# Patient Record
Sex: MALE | Race: WHITE | Employment: FULL TIME | URBAN - METROPOLITAN AREA
[De-identification: names, ages, dates, MRNs, and addresses within clinical notes are randomized per-mention and may not be internally consistent; named-entity substitution may affect disease eponyms.]

---

## 2017-08-31 ENCOUNTER — HOSPITAL ENCOUNTER (OUTPATIENT)
Dept: PHYSICAL THERAPY | Age: 47
Discharge: HOME | End: 2017-08-31
Payer: COMMERCIAL

## 2017-08-31 PROCEDURE — 97140 MANUAL THERAPY 1/> REGIONS: CPT

## 2017-08-31 PROCEDURE — 97162 PT EVAL MOD COMPLEX 30 MIN: CPT

## 2017-08-31 NOTE — PROGRESS NOTES
Moon Richards  : 1970 [facility]  [address], [address], [place]  [phone]   [phone]         OUTPATIENT PHYSICAL THERAPY:Initial Assessment and Daily Note 2017    ICD-10: Treatment Diagnosis: Pain in left shoulder (M25.512); Bursitis of left shoulder (M75.52)  Precautions/Allergies: Other plant, animal, environmental  Fall Risk Score: 1 (? 5 = High Risk)  MD Orders: PT eval and tx - shoulder program MEDICAL/REFERRING DIAGNOSIS:  Pain in left shoulder   DATE OF ONSET: Spring of 2017  REFERRING PHYSICIAN: Saul Champagne MD  RETURN PHYSICIAN APPOINTMENT: 17     INITIAL ASSESSMENT:  Mr. Olivia Smith presents to PT eval w/ report of L shoulder pain. He displayed L shoulder weakness and decreased stability w/ testing. He reports difficulty performing his job demands and heavy house hold chores. He will benefit from continued skilled PT services to improve his deficits listed below and to progress towards his PLOF. PROBLEM LIST (Impacting functional limitations):  1. Decreased Strength  2. Decreased ADL/Functional Activities  3. Increased Pain  4. Decreased Activity Tolerance  5. Edema/Girth  6. Decreased [place] with Home Exercise Program INTERVENTIONS PLANNED:  1. Cold  2. Electrical Stimulation  3. Heat  4. Home Exercise Program (HEP)  5. Manual Therapy  6. Neuromuscular Re-education/Strengthening  7. Range of Motion (ROM)  8. Therapeutic Activites  9. Therapeutic Exercise/Strengthening   TREATMENT PLAN:  Effective Dates: 17 TO 18. Frequency/Duration: 2 times a week for 4 weeks  GOALS: (Goals have been discussed and agreed upon with patient. Discharge Goals: Time Frame: 4 weeks  1. Pt will be independent w/ HEP in order to improve outcomes and decrease pain levels. 2. Pt will havwe QUICKDASH score of 19 or less in order to display decreased pain and decreased functional impairments.    3. Pt will complete 10 continuous minutes on the UBE level 2 in order to display improved strength and endurance for return to work. 4. Pt will report L shoulder pain of 1/10 while abduction his L shoulder in order to return to work duties and household chores. Rehabilitation Potential For Stated Goals: Good  Regarding Rosalynd Pulse therapy, I certify that the treatment plan above will be carried out by a therapist or under their direction. Thank you for this referral,  Nicholas Eldridge PT     Referring Physician Signature: Jay Marie MD              Date                    The information in this section was collected on 8/31/17 (except where otherwise noted). HISTORY:   History of Present Injury/Illness (Reason for Referral):  Pt reports he began to have L shoulder pain at work in the Spring. He was diagnosed w/ L shoulder bursitis by his MD. He had a cortisone shot on 8/28/17. He is on a 10# work restriction per his MD.   Past Medical History/Comorbidities:   Mr. Jeffry Watts  has a past medical history of Anxiety; Hypertriglyceridemia; Low HDL (under 40); Palpitations; and Pituitary tumor. Mr. Jeffry Watts  has no past surgical history on file. Social History/Living Environment:     Lives w/ spouse in private home w/ stairs. He has a tub/shower combo. Prior Level of Function/Work/Activity:  Works at [employer] where he has to be able to lift 25# repeatedly to perform his job duties. 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 He walks 3x a week for 30 minutes. Dominant Side:         RIGHT    Current Medications:       Current Outpatient Prescriptions:     levothyroxine (SYNTHROID) 88 mcg tablet, Take 88 mcg by mouth., Disp: , Rfl:     CABERGOLINE (DOSTINEX PO), Take 0.5 mg by mouth., Disp: , Rfl:    Date Last Reviewed:  8/31/2017    Number of Personal Factors/Comorbidities that affect the Plan of Care: 1-2: MODERATE COMPLEXITY   EXAMINATION:   Observation/Orthostatic Postural Assessment:           Forward head, rounded shoulders, kyphotic posture  Palpation:          Tender to L biceps and deltoid   Special Tests:          [place]: (+)        Painful Arc: (+)        Sulcus sign (+)        Infraspinatus test: painful        Lift off: painful         Hands behind head and behind back - equal on both sides  Edema/Girth:  Mild swelling noted on L shoulder   Sensation:         WFL on B UEs w/ light touch    Joint/Muscle ROM Strength    Shoulder flexion WFL L 4-/5, R 5/5    Shoulder extension WFL L 5/5, R 5/5    Shoulder IR WFL L 4-/5, R 5/5    Shoulder ER WFL L 4-/5, R 5/5    Elbow flexion [facility] WFL    Elbow extension [facility]         Body Structures Involved:  1. Joints  2. Muscles Body Functions Affected:  1. Sensory/Pain  2. Neuromusculoskeletal  3. Movement Related Activities and Participation Affected:  1. General Tasks and Demands  2. Mobility  3. Self Care  4. Domestic Life  5. Interpersonal Interactions and Relationships  6. Community, Social and [place]   Number of elements (examined above) that affect the Plan of Care: 3: MODERATE COMPLEXITY   CLINICAL PRESENTATION:   Presentation: Evolving clinical presentation with changing clinical characteristics: MODERATE COMPLEXITY   CLINICAL DECISION MAKING:   Outcome Measure: Tool Used: Disabilities of the Arm, Shoulder and Hand (DASH) Questionnaire - Quick Version  Score:  Initial: 20/55  Most Recent: X/55 (Date: -- )   Interpretation of Score: The DASH is designed to measure the activities of daily living in person's with upper extremity dysfunction or pain. Each section is scored on a 1-5 scale, 5 representing the greatest disability. The scores of each section are added together for a total score of 55. Score 11 12-19 20-28 29-37 38-45 46-54 55   Modifier CH CI CJ CK CL CM CN     ?  Carrying, Moving, and Handling Objects:     - CURRENT STATUS: CJ - 20%-39% impaired, limited or restricted    - GOAL STATUS: CI - 1%-19% impaired, limited or restricted    - D/C STATUS:  ---------------To be determined---------------        · Medical Necessity: Patient is expected to demonstrate progress in strength, range of motion and balance to increase independence with functional tasks. Reason for Services/Other Comments:  · Patient continues to demonstrate capacity to improve strength, motion, pain levels which will increase independence. Use of outcome tool(s) and clinical judgement create a POC that gives a: Questionable prediction of patient's progress: MODERATE COMPLEXITY            TREATMENT:   (In addition to Assessment/Re-Assessment sessions the following treatments were rendered)  Pre-treatment Symptoms/Complaints:  See above. Pain: Initial:     4/10 Post Session:  0/10     THERAPEUTIC EXERCISE: (5 minutes):  Exercises per grid below to improve mobility and strength. Required minimal verbal and manual cues to promote proper body alignment, promote proper body posture and promote proper body mechanics. Progressed resistance, range, repetitions and complexity of movement as indicated. MANUAL THERAPY: (7 minutes): Joint mobilization and Soft tissue mobilization was utilized and necessary because of the patient's painful spasm and restricted motion of soft tissue. MODALITIES: (10 minutes):      *  Cold Pack Therapy in order to provide analgesia and reduce inflammation and edema. Posterior, inferior, and long axis glides to L shoulder  STM to L bicep/deltoid   Date:  8/31/17 Date:   Date:     Activity/Exercise Parameters Parameters Parameters   HEP  Issued - shoulder ER, IR, flexion, and scap retraction     Shoulder flexion 10x RTB     Shoulder IR 10x RTB     Shoulder ER 10x RTB     Scapular retraction 10x                      Treatment/Session Assessment:    · Response to Treatment:  Pt reported decreased pain w/ movement following manual treatment. He noted no pain following ice/compression to L shoulder.   · Compliance with Program/Exercises: Will assess as treatment progresses. · Recommendations/Intent for next treatment session: \"Next visit will focus on advancements to more challenging activities\".   Variance from POC: none  PT Patient Time In/Time Out  Time In: 1330  Time Out: 1421    Sean Beat, PT

## 2017-08-31 NOTE — PROGRESS NOTES
Ambulatory/Rehab Services H2 Model Falls Risk Assessment    Risk Factor Pts. ·   Confusion/Disorientation/Impulsivity  []    4 ·   Symptomatic Depression  []   2 ·   Altered Elimination  []   1 ·   Dizziness/Vertigo  []   1 ·   Gender (Male)  [x]   1 ·   Any administered antiepileptics (anticonvulsants):  []   2 ·   Any administered benzodiazepines:  []   1 ·   Visual Impairment (specify):  []   1 ·   Portable Oxygen Use  []   1 ·   Orthostatic ? BP  []   1 ·   History of Recent Falls (within 3 mos.)  []   5     Ability to Rise from Chair (choose one) Pts. ·   Ability to rise in a single movement  [x]   0 ·   Pushes up, successful in one attempt  []   1 ·   Multiple attempts, but successful  []   3 ·   Unable to rise without assistance  []   4   Total: (5 or greater = High Risk) 1     Falls Prevention Plan:   []                Physical Limitations to Exercise (specify):   []                Mobility Assistance Device (type):   []                Exercise/Equipment Adaptation (specify):    ©2010 [facility] [place] Patent #[id number].  Federal Law prohibits the replication, distribution or use without written permission from [facility]

## 2017-09-05 ENCOUNTER — HOSPITAL ENCOUNTER (OUTPATIENT)
Dept: PHYSICAL THERAPY | Age: 47
Discharge: HOME | End: 2017-09-05
Payer: COMMERCIAL

## 2017-09-05 PROCEDURE — 97110 THERAPEUTIC EXERCISES: CPT

## 2017-09-05 PROCEDURE — 97140 MANUAL THERAPY 1/> REGIONS: CPT

## 2017-09-05 NOTE — PROGRESS NOTES
Jackie Puckett  : 1970 [facility] Center at [facility]  [address], [address], [place]  [phone]   [phone]         OUTPATIENT PHYSICAL THERAPY:Daily Note 2017    ICD-10: Treatment Diagnosis: Pain in left shoulder (M25.512); Bursitis of left shoulder (M75.52)  Precautions/Allergies: Other plant, animal, environmental  Fall Risk Score: 1 (? 5 = High Risk)  MD Orders: PT eval and tx - shoulder program MEDICAL/REFERRING DIAGNOSIS:  Pain in left shoulder   DATE OF ONSET: Spring of 2017  REFERRING PHYSICIAN: Cade Mcclain MD  RETURN PHYSICIAN APPOINTMENT: 17      ASSESSMENT:  Mr. Ty Matthews presents to PT eval w/ report of L shoulder pain. He displayed L shoulder weakness and decreased stability w/ testing. He reports difficulty performing his job demands and heavy house hold chores. He will benefit from continued skilled PT services to improve his deficits listed below and to progress towards his PLOF. PROBLEM LIST (Impacting functional limitations):  1. Decreased Strength  2. Decreased ADL/Functional Activities  3. Increased Pain  4. Decreased Activity Tolerance  5. Edema/Girth  6. Decreased [place] with Home Exercise Program INTERVENTIONS PLANNED:  1. Cold  2. Electrical Stimulation  3. Heat  4. Home Exercise Program (HEP)  5. Manual Therapy  6. Neuromuscular Re-education/Strengthening  7. Range of Motion (ROM)  8. Therapeutic Activites  9. Therapeutic Exercise/Strengthening   TREATMENT PLAN:  Effective Dates: 17 TO 18. Frequency/Duration: 2 times a week for 4 weeks  GOALS: (Goals have been discussed and agreed upon with patient. Discharge Goals: Time Frame: 4 weeks  1. Pt will be independent w/ HEP in order to improve outcomes and decrease pain levels. 2. Pt will havwe QUICKDASH score of 19 or less in order to display decreased pain and decreased functional impairments.    3. Pt will complete 10 continuous minutes on the UBE level 2 in order to display improved strength and endurance for return to work. 4. Pt will report L shoulder pain of 1/10 while abduction his L shoulder in order to return to work duties and household chores. Rehabilitation Potential For Stated Goals: Good  Regarding Janel Betancourt therapy, I certify that the treatment plan above will be carried out by a therapist or under their direction. The information in this section was collected on 8/31/17 (except where otherwise noted). HISTORY:   History of Present Injury/Illness (Reason for Referral):  Pt reports he began to have L shoulder pain at work in the Spring. He was diagnosed w/ L shoulder bursitis by his MD. He had a cortisone shot on 8/28/17. He is on a 10# work restriction per his MD.   Past Medical History/Comorbidities:   Mr. Maldonado Matos  has a past medical history of Anxiety; Hypertriglyceridemia; Low HDL (under 40); Palpitations; and Pituitary tumor. Mr. Maldonado Matos  has no past surgical history on file. Social History/Living Environment:     Lives w/ spouse in private home w/ stairs. He has a tub/shower combo. Prior Level of Function/Work/Activity:  Works at [employer] where he has to be able to lift 25# repeatedly to perform his job duties. 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 He walks 3x a week for 30 minutes. Dominant Side:         RIGHT    Current Medications:       Current Outpatient Prescriptions:     levothyroxine (SYNTHROID) 88 mcg tablet, Take 88 mcg by mouth., Disp: , Rfl:     CABERGOLINE (DOSTINEX PO), Take 0.5 mg by mouth., Disp: , Rfl:    Date Last Reviewed:  9/5/2017    EXAMINATION:   Observation/Orthostatic Postural Assessment:           Forward head, rounded shoulders, kyphotic posture  Palpation:          Tender to L biceps and deltoid   Special Tests:          [place]: (+)        Painful Arc: (+)        Sulcus sign (+)        Infraspinatus test: painful        Lift off: painful         Hands behind head and behind back - equal on both sides  Edema/Girth:  Mild swelling noted on L shoulder   Sensation:         WFL on B UEs w/ light touch    Joint/Muscle ROM Strength    Shoulder flexion WFL L 4-/5, R 5/5    Shoulder extension WFL L 5/5, R 5/5    Shoulder IR WFL L 4-/5, R 5/5    Shoulder ER WFL L 4-/5, R 5/5    Elbow flexion [facility] WFL    Elbow extension [facility]         Body Structures Involved:  1. Joints  2. Muscles Body Functions Affected:  1. Sensory/Pain  2. Neuromusculoskeletal  3. Movement Related Activities and Participation Affected:  1. General Tasks and Demands  2. Mobility  3. Self Care  4. Domestic Life  5. Interpersonal Interactions and Relationships  6. Community, Social and Civic Life   CLINICAL PRESENTATION:   CLINICAL DECISION MAKING:   Outcome Measure: Tool Used: Disabilities of the Arm, Shoulder and Hand (DASH) Questionnaire - Quick Version  Score:  Initial: 20/55  Most Recent: X/55 (Date: -- )   Interpretation of Score: The DASH is designed to measure the activities of daily living in person's with upper extremity dysfunction or pain. Each section is scored on a 1-5 scale, 5 representing the greatest disability. The scores of each section are added together for a total score of 55. Score 11 12-19 20-28 29-37 38-45 46-54 55   Modifier CH CI CJ CK CL CM CN     ? Carrying, Moving, and Handling Objects:     - CURRENT STATUS: CJ - 20%-39% impaired, limited or restricted    - GOAL STATUS: CI - 1%-19% impaired, limited or restricted    - D/C STATUS:  ---------------To be determined---------------        · Medical Necessity: Patient is expected to demonstrate progress in strength, range of motion and balance to increase independence with functional tasks. Reason for Services/Other Comments:  · Patient continues to demonstrate capacity to improve strength, motion, pain levels which will increase independence.             TREATMENT:   (In addition to Assessment/Re-Assessment sessions the following treatments were rendered)  Pre-treatment Symptoms/Complaints:  Pt reports he is feeling pretty good. He notes he still has discomfort w/ abduction. Pain: Initial:     2/10 Post Session:  0/10     THERAPEUTIC EXERCISE: (25 minutes):  Exercises per grid below to improve mobility, strength, balance and coordination. Required minimal verbal and manual cues to promote proper body alignment, promote proper body posture and promote proper body mechanics. Progressed resistance, range, repetitions and complexity of movement as indicated. MANUAL THERAPY: (20 minutes): Joint mobilization and Soft tissue mobilization was utilized and necessary because of the patient's restricted joint motion, painful spasm and restricted motion of soft tissue. MODALITIES: (10 minutes):      *  Cold Pack Therapy in order to provide analgesia and reduce inflammation and edema. Posterior, inferior, and long axis glides to L shoulder -grades 2-3  STM to L bicep/deltoid   Date:  8/31/17 Date:  9/5/17 Date:     Activity/Exercise Parameters Parameters Parameters   HEP  Issued - shoulder ER, IR, flexion, and scap retraction     Shoulder flexion 10x RTB     Shoulder IR 10x RTB 3x10, GTB    Shoulder ER 10x RTB 3x10, GTB    Scapular retraction 10x     Wall clock      UBE  1.6 5/5    Quadruped balance      Body blade      Pulleys  20x abd and flex    Rows  2x10, stopped due to pain, GTB    Is  YTB, 15x         Treatment/Session Assessment:    · Response to Treatment:  Pt required verbal/tactile cues for completion of UE therex. He is progressing towards his goals, but still having pain in 90* abduction. He had tightness/tenderness on his L deltoid/biceps. · Compliance with Program/Exercises: Will assess as treatment progresses. · Recommendations/Intent for next treatment session: \"Next visit will focus on advancements to more challenging activities\".   Variance from POC: none  PT Patient Time In/Time Out  Time In: 4858  Time Out: 36629 Lyndon Lawrence

## 2017-09-07 ENCOUNTER — HOSPITAL ENCOUNTER (OUTPATIENT)
Dept: PHYSICAL THERAPY | Age: 47
Discharge: HOME | End: 2017-09-07
Payer: COMMERCIAL

## 2017-09-07 PROCEDURE — 97110 THERAPEUTIC EXERCISES: CPT

## 2017-09-07 NOTE — PROGRESS NOTES
Ondina Whittaker  : 1970 [facility]  [address], [address], [place]  [phone]   [phone]         OUTPATIENT PHYSICAL THERAPY:Daily Note 2017    ICD-10: Treatment Diagnosis: Pain in left shoulder (M25.512); Bursitis of left shoulder (M75.52)  Precautions/Allergies: Other plant, animal, environmental  Fall Risk Score: 1 (? 5 = High Risk)  MD Orders: PT eval and tx - shoulder program MEDICAL/REFERRING DIAGNOSIS:  Pain in left shoulder   DATE OF ONSET: Spring of 2017  REFERRING PHYSICIAN: Chandan Rosales MD  RETURN PHYSICIAN APPOINTMENT: 17      ASSESSMENT:  Mr. Jason Paiz presents to PT mario w/ report of L shoulder pain. He displayed L shoulder weakness and decreased stability w/ testing. He reports difficulty performing his job demands and heavy house hold chores. He will benefit from continued skilled PT services to improve his deficits listed below and to progress towards his PLOF. PROBLEM LIST (Impacting functional limitations):  1. Decreased Strength  2. Decreased ADL/Functional Activities  3. Increased Pain  4. Decreased Activity Tolerance  5. Edema/Girth  6. Decreased [place] with Home Exercise Program INTERVENTIONS PLANNED:  1. Cold  2. Electrical Stimulation  3. Heat  4. Home Exercise Program (HEP)  5. Manual Therapy  6. Neuromuscular Re-education/Strengthening  7. Range of Motion (ROM)  8. Therapeutic Activites  9. Therapeutic Exercise/Strengthening   TREATMENT PLAN:  Effective Dates: 17 TO 18. Frequency/Duration: 2 times a week for 4 weeks  GOALS: (Goals have been discussed and agreed upon with patient. Discharge Goals: Time Frame: 4 weeks  1. Pt will be independent w/ HEP in order to improve outcomes and decrease pain levels. 2. Pt will havwe QUICKDASH score of 19 or less in order to display decreased pain and decreased functional impairments.    3. Pt will complete 10 continuous minutes on the UBE level 2 in order to display improved strength and endurance for return to work. 4. Pt will report L shoulder pain of 1/10 while abduction his L shoulder in order to return to work duties and household chores. Rehabilitation Potential For Stated Goals: Good  Regarding Shankar Farfan therapy, I certify that the treatment plan above will be carried out by a therapist or under their direction. The information in this section was collected on 8/31/17 (except where otherwise noted). HISTORY:   History of Present Injury/Illness (Reason for Referral):  Pt reports he began to have L shoulder pain at work in the Spring. He was diagnosed w/ L shoulder bursitis by his MD. He had a cortisone shot on 8/28/17. He is on a 10# work restriction per his MD.   Past Medical History/Comorbidities:   Mr. Maryann Dennis  has a past medical history of Anxiety; Hypertriglyceridemia; Low HDL (under 40); Palpitations; and Pituitary tumor. Mr. Maryann Dennis  has no past surgical history on file. Social History/Living Environment:     Lives w/ spouse in private home w/ stairs. He has a tub/shower combo. Prior Level of Function/Work/Activity:  Works at [employer] where he has to be able to lift 25# repeatedly to perform his job duties. 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 He walks 3x a week for 30 minutes. Dominant Side:         RIGHT    Current Medications:       Current Outpatient Prescriptions:     levothyroxine (SYNTHROID) 88 mcg tablet, Take 88 mcg by mouth., Disp: , Rfl:     CABERGOLINE (DOSTINEX PO), Take 0.5 mg by mouth., Disp: , Rfl:    Date Last Reviewed:  9/7/2017    EXAMINATION:   Observation/Orthostatic Postural Assessment:           Forward head, rounded shoulders, kyphotic posture  Palpation:          Tender to L biceps and deltoid   Special Tests:          Zahida Bravo: (+)        Painful Arc: (+)        Sulcus sign (+)        Infraspinatus test: painful        Lift off: painful         Hands behind head and behind back - equal on both sides  Edema/Girth:  Mild swelling noted on L shoulder   Sensation:         WFL on B UEs w/ light touch    Joint/Muscle ROM Strength    Shoulder flexion WFL L 4-/5, R 5/5    Shoulder extension WFL L 5/5, R 5/5    Shoulder IR WFL L 4-/5, R 5/5    Shoulder ER WFL L 4-/5, R 5/5    Elbow flexion [facility] WFL    Elbow extension [facility]         Body Structures Involved:  1. Joints  2. Muscles Body Functions Affected:  1. Sensory/Pain  2. Neuromusculoskeletal  3. Movement Related Activities and Participation Affected:  1. General Tasks and Demands  2. Mobility  3. Self Care  4. Domestic Life  5. Interpersonal Interactions and Relationships  6. Community, Social and Civic Life   CLINICAL PRESENTATION:   CLINICAL DECISION MAKING:   Outcome Measure: Tool Used: Disabilities of the Arm, Shoulder and Hand (DASH) Questionnaire - Quick Version  Score:  Initial: 20/55  Most Recent: X/55 (Date: -- )   Interpretation of Score: The DASH is designed to measure the activities of daily living in person's with upper extremity dysfunction or pain. Each section is scored on a 1-5 scale, 5 representing the greatest disability. The scores of each section are added together for a total score of 55. Score 11 12-19 20-28 29-37 38-45 46-54 55   Modifier CH CI CJ CK CL CM CN     ? Carrying, Moving, and Handling Objects:     - CURRENT STATUS: CJ - 20%-39% impaired, limited or restricted    - GOAL STATUS: CI - 1%-19% impaired, limited or restricted    - D/C STATUS:  ---------------To be determined---------------        · Medical Necessity: Patient is expected to demonstrate progress in strength, range of motion and balance to increase independence with functional tasks. Reason for Services/Other Comments:  · Patient continues to demonstrate capacity to improve strength, motion, pain levels which will increase independence.             TREATMENT:   (In addition to Assessment/Re-Assessment sessions the following treatments were rendered)  Pre-treatment Symptoms/Complaints:  Pt reports he is feeling pretty good. He notes he is having less pain w/ abduction. Pain: Initial:     1/10 Post Session:  0/10     THERAPEUTIC EXERCISE: (44 minutes):  Exercises per grid below to improve mobility, strength, balance and coordination. Required minimal verbal and manual cues to promote proper body alignment, promote proper body posture and promote proper body mechanics. Progressed resistance, range, repetitions and complexity of movement as indicated. MODALITIES: (10 minutes):      *  Cold Pack Therapy in order to provide analgesia and reduce inflammation and edema. Long axis glides   Date:  8/31/17 Date:  9/5/17 Date:  9/7/17   Activity/Exercise Parameters Parameters Parameters   HEP  Issued - shoulder ER, IR, flexion, and scap retraction     Shoulder flexion 10x RTB     Shoulder IR 10x RTB 3x10, GTB 3x10, GTB   Shoulder ER 10x RTB 3x10, GTB 3x10, GTB   Scapular retraction 10x     Wall clock   10x, YTB   UBE  1.6 5/5 1.5 5/5   Quadruped balance      Body blade   10x into flexion   Pulleys  20x abd and flex 30x abd   Rows  2x10, stopped due to pain, GTB 3x10, prone, 2#   Is  YTB, 15x 3x10, prone, 2#        Treatment/Session Assessment:    · Response to Treatment:  Pt required verbal/tactile cues for completion of UE therex. He is progressing towards his goals. · Compliance with Program/Exercises: Will assess as treatment progresses. · Recommendations/Intent for next treatment session: \"Next visit will focus on advancements to more challenging activities\".   Variance from POC: none  PT Patient Time In/Time Out  Time In: 1430  Time Out: Sam [address], PT

## 2017-09-12 ENCOUNTER — HOSPITAL ENCOUNTER (OUTPATIENT)
Dept: PHYSICAL THERAPY | Age: 47
Discharge: HOME | End: 2017-09-12
Payer: COMMERCIAL

## 2017-09-12 PROCEDURE — 97140 MANUAL THERAPY 1/> REGIONS: CPT

## 2017-09-12 PROCEDURE — 97110 THERAPEUTIC EXERCISES: CPT

## 2017-09-12 NOTE — PROGRESS NOTES
Ondina Whittaker  : 1970 [facility]  [address], [address], [place]  [phone]   [phone]         OUTPATIENT PHYSICAL THERAPY:Daily Note 2017    ICD-10: Treatment Diagnosis: Pain in left shoulder (M25.512); Bursitis of left shoulder (M75.52)  Precautions/Allergies: Other plant, animal, environmental  Fall Risk Score: 1 (? 5 = High Risk)  MD Orders: PT eval and tx - shoulder program MEDICAL/REFERRING DIAGNOSIS:  Pain in left shoulder   DATE OF ONSET: Spring of 2017  REFERRING PHYSICIAN: Chandan Rosales MD  RETURN PHYSICIAN APPOINTMENT: 17      ASSESSMENT:  Mr. Jason Paiz presents to PT mario w/ report of L shoulder pain. He displayed L shoulder weakness and decreased stability w/ testing. He reports difficulty performing his job demands and heavy house hold chores. He will benefit from continued skilled PT services to improve his deficits listed below and to progress towards his PLOF. PROBLEM LIST (Impacting functional limitations):  1. Decreased Strength  2. Decreased ADL/Functional Activities  3. Increased Pain  4. Decreased Activity Tolerance  5. Edema/Girth  6. Decreased [place] with Home Exercise Program INTERVENTIONS PLANNED:  1. Cold  2. Electrical Stimulation  3. Heat  4. Home Exercise Program (HEP)  5. Manual Therapy  6. Neuromuscular Re-education/Strengthening  7. Range of Motion (ROM)  8. Therapeutic Activites  9. Therapeutic Exercise/Strengthening   TREATMENT PLAN:  Effective Dates: 17 TO 18. Frequency/Duration: 2 times a week for 4 weeks  GOALS: (Goals have been discussed and agreed upon with patient. Discharge Goals: Time Frame: 4 weeks  1. Pt will be independent w/ HEP in order to improve outcomes and decrease pain levels. 2. Pt will havwe QUICKDASH score of 19 or less in order to display decreased pain and decreased functional impairments.    3. Pt will complete 10 continuous minutes on the UBE level 2 in order to display improved strength and endurance for return to work. 4. Pt will report L shoulder pain of 1/10 while abduction his L shoulder in order to return to work duties and household chores. Rehabilitation Potential For Stated Goals: Good  Regarding Susen Cassette therapy, I certify that the treatment plan above will be carried out by a therapist or under their direction. The information in this section was collected on 8/31/17 (except where otherwise noted). HISTORY:   History of Present Injury/Illness (Reason for Referral):  Pt reports he began to have L shoulder pain at work in the Spring. He was diagnosed w/ L shoulder bursitis by his MD. He had a cortisone shot on 8/28/17. He is on a 10# work restriction per his MD.   Past Medical History/Comorbidities:   Mr. Daniel Aguilar  has a past medical history of Anxiety; Hypertriglyceridemia; Low HDL (under 40); Palpitations; and Pituitary tumor. Mr. Daniel Aguilar  has no past surgical history on file. Social History/Living Environment:     Lives w/ spouse in private home w/ stairs. He has a tub/shower combo. Prior Level of Function/Work/Activity:  Works at [employer] where he has to be able to lift 25# repeatedly to perform his job duties. 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 He walks 3x a week for 30 minutes. Dominant Side:         RIGHT    Current Medications:       Current Outpatient Prescriptions:     levothyroxine (SYNTHROID) 88 mcg tablet, Take 88 mcg by mouth., Disp: , Rfl:     CABERGOLINE (DOSTINEX PO), Take 0.5 mg by mouth., Disp: , Rfl:    Date Last Reviewed:  9/12/2017    EXAMINATION:   Observation/Orthostatic Postural Assessment:           Forward head, rounded shoulders, kyphotic posture  Palpation:          Tender to L biceps and deltoid   Special Tests:          Ellie Riis: (+)        Painful Arc: (+)        Sulcus sign (+)        Infraspinatus test: painful        Lift off: painful         Hands behind head and behind back - equal on both sides  Edema/Girth:  Mild swelling noted on L shoulder   Sensation:         WFL on B UEs w/ light touch    Joint/Muscle ROM Strength    Shoulder flexion WFL L 4-/5, R 5/5    Shoulder extension WFL L 5/5, R 5/5    Shoulder IR WFL L 4-/5, R 5/5    Shoulder ER WFL L 4-/5, R 5/5    Elbow flexion [facility] WFL    Elbow extension [facility]         Body Structures Involved:  1. Joints  2. Muscles Body Functions Affected:  1. Sensory/Pain  2. Neuromusculoskeletal  3. Movement Related Activities and Participation Affected:  1. General Tasks and Demands  2. Mobility  3. Self Care  4. Domestic Life  5. Interpersonal Interactions and Relationships  6. Community, Social and Civic Life   CLINICAL PRESENTATION:   CLINICAL DECISION MAKING:   Outcome Measure: Tool Used: Disabilities of the Arm, Shoulder and Hand (DASH) Questionnaire - Quick Version  Score:  Initial: 20/55  Most Recent: X/55 (Date: -- )   Interpretation of Score: The DASH is designed to measure the activities of daily living in person's with upper extremity dysfunction or pain. Each section is scored on a 1-5 scale, 5 representing the greatest disability. The scores of each section are added together for a total score of 55. Score 11 12-19 20-28 29-37 38-45 46-54 55   Modifier CH CI CJ CK CL CM CN     ? Carrying, Moving, and Handling Objects:     - CURRENT STATUS: CJ - 20%-39% impaired, limited or restricted    - GOAL STATUS: CI - 1%-19% impaired, limited or restricted    - D/C STATUS:  ---------------To be determined---------------        · Medical Necessity: Patient is expected to demonstrate progress in strength, range of motion and balance to increase independence with functional tasks. Reason for Services/Other Comments:  · Patient continues to demonstrate capacity to improve strength, motion, pain levels which will increase independence.             TREATMENT:   (In addition to Assessment/Re-Assessment sessions the following treatments were rendered)  Pre-treatment Symptoms/Complaints:  Pt reports soreness on L deltoid and tricep area. Pain: Initial:     1/10 Post Session:  0/10     THERAPEUTIC EXERCISE: (30 minutes):  Exercises per grid below to improve mobility, strength, balance and coordination. Required minimal verbal and manual cues to promote proper body alignment, promote proper body posture and promote proper body mechanics. Progressed resistance, range, repetitions and complexity of movement as indicated. MANUAL THERAPY: (15 minutes): Soft tissue mobilization was utilized and necessary because of the patient's painful spasm. MODALITIES: (10 minutes):      *  Cold Pack Therapy in order to provide analgesia and reduce inflammation and edema. Long axis glides   Date:  8/31/17 Date:  9/5/17 Date:  9/7/17 Date:  9/12/17   Activity/Exercise Parameters Parameters Parameters Parameters   HEP  Issued - shoulder ER, IR, flexion, and scap retraction      Shoulder flexion 10x RTB      Shoulder IR 10x RTB 3x10, GTB 3x10, GTB 3x10, GTB   Shoulder ER 10x RTB 3x10, GTB 3x10, GTB 3x10, GTB   Scapular retraction 10x      Wall clock   10x, YTB    UBE  1.6 5/5 1.5 5/5 2.0 5/5   Quadruped balance       Body blade   10x into flexion    Pulleys  20x abd and flex 30x abd 30x into abd    Rows  2x10, stopped due to pain, GTB 3x10, prone, 2# 3x10, prone 2#   Is  YTB, 15x 3x10, prone, 2# 3x10, prone, 2#        Treatment/Session Assessment:    · Response to Treatment:  Pt is progressing towards his goals. Ice continues to decrease his pain levels. · Compliance with Program/Exercises: Will assess as treatment progresses. · Recommendations/Intent for next treatment session: \"Next visit will focus on advancements to more challenging activities\".   Variance from POC: none  PT Patient Time In/Time Out  Time In: 1430  Time Out: [address] [facility], PT

## 2017-09-14 ENCOUNTER — HOSPITAL ENCOUNTER (OUTPATIENT)
Dept: PHYSICAL THERAPY | Age: 47
Discharge: HOME | End: 2017-09-14
Payer: COMMERCIAL

## 2017-09-14 PROCEDURE — 97110 THERAPEUTIC EXERCISES: CPT

## 2017-09-14 PROCEDURE — 97016 VASOPNEUMATIC DEVICE THERAPY: CPT
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
## 2017-09-19 ENCOUNTER — HOSPITAL ENCOUNTER (OUTPATIENT)
Dept: PHYSICAL THERAPY | Age: 47
Discharge: HOME | End: 2017-09-19
Payer: COMMERCIAL

## 2017-09-19 PROCEDURE — 97110 THERAPEUTIC EXERCISES: CPT

## 2017-09-19 NOTE — PROGRESS NOTES
Kalyan Aguilerad  : 1970 [facility] at [facility]  [address], [address], [place]  [phone]   [phone]         OUTPATIENT PHYSICAL THERAPY:Daily Note 2017    ICD-10: Treatment Diagnosis: Pain in left shoulder (M25.512); Bursitis of left shoulder (M75.52)  Precautions/Allergies: Other plant, animal, environmental  Fall Risk Score: 1 (? 5 = High Risk)  MD Orders: PT eval and tx - shoulder program MEDICAL/REFERRING DIAGNOSIS:  Pain in left shoulder   DATE OF ONSET: Spring of 2017  REFERRING PHYSICIAN: Toya Gonzalez MD  RETURN PHYSICIAN APPOINTMENT: 17      ASSESSMENT:  Mr. Percy Webster presents to PT eval w/ report of L shoulder pain. He displayed L shoulder weakness and decreased stability w/ testing. He reports difficulty performing his job demands and heavy house hold chores. He will benefit from continued skilled PT services to improve his deficits listed below and to progress towards his PLOF. PROBLEM LIST (Impacting functional limitations):  1. Decreased Strength  2. Decreased ADL/Functional Activities  3. Increased Pain  4. Decreased Activity Tolerance  5. Edema/Girth  6. Decreased [place] with Home Exercise Program INTERVENTIONS PLANNED:  1. Cold  2. Electrical Stimulation  3. Heat  4. Home Exercise Program (HEP)  5. Manual Therapy  6. Neuromuscular Re-education/Strengthening  7. Range of Motion (ROM)  8. Therapeutic Activites  9. Therapeutic Exercise/Strengthening   TREATMENT PLAN:  Effective Dates: 17 TO 18. Frequency/Duration: 2 times a week for 4 weeks  GOALS: (Goals have been discussed and agreed upon with patient. Discharge Goals: Time Frame: 4 weeks  1. Pt will be independent w/ HEP in order to improve outcomes and decrease pain levels. 2. Pt will havwe QUICKDASH score of 19 or less in order to display decreased pain and decreased functional impairments.    3. Pt will complete 10 continuous minutes on the UBE level 2 in order to display improved strength and endurance for return to work. 4. Pt will report L shoulder pain of 1/10 while abduction his L shoulder in order to return to work duties and household chores. Rehabilitation Potential For Stated Goals: Good  Regarding Juan Tserign therapy, I certify that the treatment plan above will be carried out by a therapist or under their direction. The information in this section was collected on 8/31/17 (except where otherwise noted). HISTORY:   History of Present Injury/Illness (Reason for Referral):  Pt reports he began to have L shoulder pain at work in the Spring. He was diagnosed w/ L shoulder bursitis by his MD. He had a cortisone shot on 8/28/17. He is on a 10# work restriction per his MD.   Past Medical History/Comorbidities:   Mr. Lissy Mccrary  has a past medical history of Anxiety; Hypertriglyceridemia; Low HDL (under 40); Palpitations; and Pituitary tumor. Mr. Lissy Mccrary  has no past surgical history on file. Social History/Living Environment:     Lives w/ spouse in private home w/ stairs. He has a tub/shower combo. Prior Level of Function/Work/Activity:  Works at [employer] where he has to be able to lift 25# repeatedly to perform his job duties. 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 He walks 3x a week for 30 minutes. Dominant Side:         RIGHT    Current Medications:       Current Outpatient Prescriptions:     levothyroxine (SYNTHROID) 88 mcg tablet, Take 88 mcg by mouth., Disp: , Rfl:     CABERGOLINE (DOSTINEX PO), Take 0.5 mg by mouth., Disp: , Rfl:    Date Last Reviewed:  9/19/2017    EXAMINATION:   Observation/Orthostatic Postural Assessment:           Forward head, rounded shoulders, kyphotic posture  Palpation:          Tender to L biceps and deltoid   Special Tests:          Lin Lyn: (+)        Painful Arc: (+)        Sulcus sign (+)        Infraspinatus test: painful        Lift off: painful         Hands behind head and behind back - equal on both sides  Edema/Girth:  Mild swelling noted on L shoulder   Sensation:         WFL on B UEs w/ light touch    Joint/Muscle ROM Strength    Shoulder flexion WFL L 4-/5, R 5/5    Shoulder extension WFL L 5/5, R 5/5    Shoulder IR WFL L 4-/5, R 5/5    Shoulder ER WFL L 4-/5, R 5/5    Elbow flexion [facility] WFL    Elbow extension [facility]         Body Structures Involved:  1. Joints  2. Muscles Body Functions Affected:  1. Sensory/Pain  2. Neuromusculoskeletal  3. Movement Related Activities and Participation Affected:  1. General Tasks and Demands  2. Mobility  3. Self Care  4. Domestic Life  5. Interpersonal Interactions and Relationships  6. Community, Social and Civic Life   CLINICAL PRESENTATION:   CLINICAL DECISION MAKING:   Outcome Measure: Tool Used: Disabilities of the Arm, Shoulder and Hand (DASH) Questionnaire - Quick Version  Score:  Initial: 20/55  Most Recent: X/55 (Date: -- )   Interpretation of Score: The DASH is designed to measure the activities of daily living in person's with upper extremity dysfunction or pain. Each section is scored on a 1-5 scale, 5 representing the greatest disability. The scores of each section are added together for a total score of 55. Score 11 12-19 20-28 29-37 38-45 46-54 55   Modifier CH CI CJ CK CL CM CN     ? Carrying, Moving, and Handling Objects:     - CURRENT STATUS: CJ - 20%-39% impaired, limited or restricted    - GOAL STATUS: CI - 1%-19% impaired, limited or restricted    - D/C STATUS:  ---------------To be determined---------------        · Medical Necessity: Patient is expected to demonstrate progress in strength, range of motion and balance to increase independence with functional tasks. Reason for Services/Other Comments:  · Patient continues to demonstrate capacity to improve strength, motion, pain levels which will increase independence.             TREATMENT:   (In addition to Assessment/Re-Assessment sessions the following treatments were rendered)  Pre-treatment Symptoms/Complaints:  Pt reports soreness after last session. He notes he is definitely getting better. Pain: Initial:     .5/10 Post Session:  0.25/10     MANUAL THERAPY: (0 minutes): Soft tissue mobilization was utilized and necessary because of the patient's painful spasm. Long axis glides  MODALITIES: (10 minutes):      vasopneumatic compression with Game Ready moderate compression and temp 36 degrees to left shoulder for edema reduction. THERAPEUTIC EXERCISE: (44 minutes):  Exercises per grid below to improve mobility, strength, balance and coordination. Required minimal verbal and manual cues to promote proper body alignment, promote proper body posture and promote proper body mechanics. Progressed resistance, range, repetitions and complexity of movement as indicated.    Date:  8/31/17 Date:  9/5/17 Date:  9/7/17 Date:  9/12/17 Date:  9/14/17 Date:  9/19/17   Activity/Exercise Parameters Parameters Parameters Parameters Parameters Parameters   HEP  Issued - shoulder ER, IR, flexion, and scap retraction        Shoulder flexion 10x RTB        Shoulder IR 10x RTB 3x10, GTB 3x10, GTB 3x10, GTB 3x10, GTB    Shoulder ER 10x RTB 3x10, GTB 3x10, GTB 3x10, GTB 3x10, GTB    Scapular retraction 10x        Wall clock   10x, YTB      UBE  1.6 5/5 1.5 5/5 2.0 5/5 2.0 5/5 2.0 5/5   Quadruped balance         Body blade   10x into flexion      Pulleys  20x abd and flex 30x abd 30x into abd      Rows  2x10, stopped due to pain, GTB 3x10, prone, 2# 3x10, prone 2# 3x10, prone 2# 3x10, prone 2#   Is  YTB, 15x 3x10, prone, 2# 3x10, prone, 2# 3x10, prone 2# 3x10, prone 2#   scap retraction     3x10, prone 2# 3x10, prone 2#   Cable cross diagonals     7#  4 x 10 7#, 4x10   Wall push ups     X 10 3x10   Ball on wall  flx/abd     4 x 10 4x10        Treatment/Session Assessment:    · Response to Treatment:  Pt completed exercises w/ minimal cueing. He is progressing towards his goals. · Compliance with Program/Exercises: Will assess as treatment progresses. · Recommendations/Intent for next treatment session: \"Next visit will focus on advancements to more challenging activities\".   Variance from POC: none  PT Patient Time In/Time Out  Time In: 1430  Time Out: Sam 243, PT

## 2017-09-21 ENCOUNTER — HOSPITAL ENCOUNTER (OUTPATIENT)
Dept: PHYSICAL THERAPY | Age: 47
Discharge: HOME | End: 2017-09-21
Payer: COMMERCIAL

## 2017-09-21 PROCEDURE — 97016 VASOPNEUMATIC DEVICE THERAPY: CPT

## 2017-09-21 PROCEDURE — 97110 THERAPEUTIC EXERCISES: CPT

## 2017-09-21 NOTE — PROGRESS NOTES
Terrence Holiday  : 1970 [facility] at [facility]  [address], [address], [place]  [phone]   [phone]         OUTPATIENT PHYSICAL THERAPY:Daily Note 2017    ICD-10: Treatment Diagnosis: Pain in left shoulder (M25.512); Bursitis of left shoulder (M75.52)  Precautions/Allergies: Other plant, animal, environmental  Fall Risk Score: 1 (? 5 = High Risk)  MD Orders: PT eval and tx - shoulder program MEDICAL/REFERRING DIAGNOSIS:  Pain in left shoulder   DATE OF ONSET: Spring of 2017  REFERRING PHYSICIAN: Leslie Martin MD  RETURN PHYSICIAN APPOINTMENT: 17      ASSESSMENT:  Mr. Maldonado Matos presents to PT eval w/ report of L shoulder pain. He displayed L shoulder weakness and decreased stability w/ testing. He reports difficulty performing his job demands and heavy house hold chores. He will benefit from continued skilled PT services to improve his deficits listed below and to progress towards his PLOF. PROBLEM LIST (Impacting functional limitations):  1. Decreased Strength  2. Decreased ADL/Functional Activities  3. Increased Pain  4. Decreased Activity Tolerance  5. Edema/Girth  6. Decreased [place] with Home Exercise Program INTERVENTIONS PLANNED:  1. Cold  2. Electrical Stimulation  3. Heat  4. Home Exercise Program (HEP)  5. Manual Therapy  6. Neuromuscular Re-education/Strengthening  7. Range of Motion (ROM)  8. Therapeutic Activites  9. Therapeutic Exercise/Strengthening   TREATMENT PLAN:  Effective Dates: 17 TO 18. Frequency/Duration: 2 times a week for 4 weeks  GOALS: (Goals have been discussed and agreed upon with patient. Discharge Goals: Time Frame: 4 weeks  1. Pt will be independent w/ HEP in order to improve outcomes and decrease pain levels. 2. Pt will havwe QUICKDASH score of 19 or less in order to display decreased pain and decreased functional impairments.    3. Pt will complete 10 continuous minutes on the UBE level 2 in order to display improved strength and endurance for return to work. 4. Pt will report L shoulder pain of 1/10 while abduction his L shoulder in order to return to work duties and household chores. Rehabilitation Potential For Stated Goals: Good  Regarding Janel Betancourt therapy, I certify that the treatment plan above will be carried out by a therapist or under their direction. The information in this section was collected on 8/31/17 (except where otherwise noted). HISTORY:   History of Present Injury/Illness (Reason for Referral):  Pt reports he began to have L shoulder pain at work in the Spring. He was diagnosed w/ L shoulder bursitis by his MD. He had a cortisone shot on 8/28/17. He is on a 10# work restriction per his MD.   Past Medical History/Comorbidities:   Mr. Maldonado Matos  has a past medical history of Anxiety; Hypertriglyceridemia; Low HDL (under 40); Palpitations; and Pituitary tumor. Mr. Maldonado Matos  has no past surgical history on file. Social History/Living Environment:     Lives w/ spouse in private home w/ stairs. He has a tub/shower combo. Prior Level of Function/Work/Activity:  Works at [employer] where he has to be able to lift 25# repeatedly to perform his job duties. 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 He walks 3x a week for 30 minutes. Dominant Side:         RIGHT    Current Medications:       Current Outpatient Prescriptions:     levothyroxine (SYNTHROID) 88 mcg tablet, Take 88 mcg by mouth., Disp: , Rfl:     CABERGOLINE (DOSTINEX PO), Take 0.5 mg by mouth., Disp: , Rfl:    Date Last Reviewed:  9/21/2017    EXAMINATION:   Observation/Orthostatic Postural Assessment:           Forward head, rounded shoulders, kyphotic posture  Palpation:          Tender to L biceps and deltoid   Special Tests:          Kinza Massa: (+)        Painful Arc: (+)        Sulcus sign (+)        Infraspinatus test: painful        Lift off: painful         Hands behind head and behind back - equal on both sides  Edema/Girth:  Mild swelling noted on L shoulder   Sensation:         WFL on B UEs w/ light touch    Joint/Muscle ROM Strength    Shoulder flexion WFL L 4-/5, R 5/5    Shoulder extension WFL L 5/5, R 5/5    Shoulder IR WFL L 4-/5, R 5/5    Shoulder ER WFL L 4-/5, R 5/5    Elbow flexion [facility] WFL    Elbow extension [facility]         Body Structures Involved:  1. Joints  2. Muscles Body Functions Affected:  1. Sensory/Pain  2. Neuromusculoskeletal  3. Movement Related Activities and Participation Affected:  1. General Tasks and Demands  2. Mobility  3. Self Care  4. Domestic Life  5. Interpersonal Interactions and Relationships  6. Community, Social and Civic Life   CLINICAL PRESENTATION:   CLINICAL DECISION MAKING:   Outcome Measure: Tool Used: Disabilities of the Arm, Shoulder and Hand (DASH) Questionnaire - Quick Version  Score:  Initial: 20/55  Most Recent: X/55 (Date: -- )   Interpretation of Score: The DASH is designed to measure the activities of daily living in person's with upper extremity dysfunction or pain. Each section is scored on a 1-5 scale, 5 representing the greatest disability. The scores of each section are added together for a total score of 55. Score 11 12-19 20-28 29-37 38-45 46-54 55   Modifier CH CI CJ CK CL CM CN     ? Carrying, Moving, and Handling Objects:     - CURRENT STATUS: CJ - 20%-39% impaired, limited or restricted    - GOAL STATUS: CI - 1%-19% impaired, limited or restricted    - D/C STATUS:  ---------------To be determined---------------        · Medical Necessity: Patient is expected to demonstrate progress in strength, range of motion and balance to increase independence with functional tasks. Reason for Services/Other Comments:  · Patient continues to demonstrate capacity to improve strength, motion, pain levels which will increase independence.             TREATMENT:   (In addition to Assessment/Re-Assessment sessions the following treatments were rendered)  Pre-treatment Symptoms/Complaints:  Pt reports soreness after last session. He notes he is definitely getting better. Pain: Initial:     2/10 Post Session:  1/10     MANUAL THERAPY: (0 minutes): Soft tissue mobilization was utilized and necessary because of the patient's painful spasm. Long axis glides  MODALITIES: (10 minutes):      vasopneumatic compression with Game Ready moderate compression and temp 36 degrees to left shoulder for edema reduction. THERAPEUTIC EXERCISE: (45 minutes):  Exercises per grid below to improve mobility, strength, balance and coordination. Required minimal verbal and manual cues to promote proper body alignment, promote proper body posture and promote proper body mechanics. Progressed resistance, range, repetitions and complexity of movement as indicated.    Date:  8/31/17 Date:  9/5/17 Date:  9/7/17 Date:  9/12/17 Date:  9/14/17 Date:  9/19/17 Date:  9/21/17   Activity/Exercise Parameters Parameters Parameters Parameters Parameters Parameters Parameters   HEP  Issued - shoulder ER, IR, flexion, and scap retraction         Shoulder flexion 10x RTB         Shoulder IR 10x RTB 3x10, GTB 3x10, GTB 3x10, GTB 3x10, GTB  20x, sidelying, 2#   Shoulder ER 10x RTB 3x10, GTB 3x10, GTB 3x10, GTB 3x10, GTB  20x, sidelying, 2#   Scapular retraction 10x         Wall clock   10x, YTB       UBE  1.6 5/5 1.5 5/5 2.0 5/5 2.0 5/5 2.0 5/5 5/5 2.0   Quadruped balance          Body blade   10x into flexion       Pulleys  20x abd and flex 30x abd 30x into abd       Rows  2x10, stopped due to pain, GTB 3x10, prone, 2# 3x10, prone 2# 3x10, prone 2# 3x10, prone 2# 3x10, prone, 2#   Is  YTB, 15x 3x10, prone, 2# 3x10, prone, 2# 3x10, prone 2# 3x10, prone 2# 3x10, prone, 2#   scap retraction     3x10, prone 2# 3x10, prone 2#    Cable cross diagonals     7#  4 x 10 7#, 4x10    Wall push ups     X 10 3x10    Ball on wall  flx/abd     4 x 10 4x10    Shoulder ROM cirlces       20x   Wand flexion, extension, ER       30x w/ 2# bar   Scaption       20x, 2#   Sleeper stretch       L 3x30 sec   Cross body stretch       L 3x30 sec   Thoracic extension       10x in chair   Pec stretch       1 min supine on foam        Treatment/Session Assessment:    · Response to Treatment:  Pt sore w/ scaption this session and w/ stretches. He is progressing towards his goals. · Compliance with Program/Exercises: Will assess as treatment progresses. · Recommendations/Intent for next treatment session: \"Next visit will focus on advancements to more challenging activities\".  Soft tissue to L biceps and deltoid tender areas  Variance from POC: none  PT Patient Time In/Time Out  Time In: 1424  Time Out: 1650 Tripp Devon, PT

## 2017-09-26 ENCOUNTER — HOSPITAL ENCOUNTER (OUTPATIENT)
Dept: PHYSICAL THERAPY | Age: 47
Discharge: HOME | End: 2017-09-26
Payer: COMMERCIAL

## 2017-09-26 PROCEDURE — 97016 VASOPNEUMATIC DEVICE THERAPY: CPT

## 2017-09-26 PROCEDURE — 97110 THERAPEUTIC EXERCISES: CPT

## 2017-09-26 PROCEDURE — 97140 MANUAL THERAPY 1/> REGIONS: CPT

## 2017-09-26 NOTE — PROGRESS NOTES
Arnaldo Sensor  : 1970 [facility]  [address], [address], [place]  [phone]   [phone]         OUTPATIENT PHYSICAL THERAPY:Daily Note 2017    ICD-10: Treatment Diagnosis: Pain in left shoulder (M25.512); Bursitis of left shoulder (M75.52)  Precautions/Allergies: Other plant, animal, environmental  Fall Risk Score: 1 (? 5 = High Risk)  MD Orders: PT eval and tx - shoulder program MEDICAL/REFERRING DIAGNOSIS:  Pain in left shoulder   DATE OF ONSET: Spring of 2017  REFERRING PHYSICIAN: Sujatha Warren MD  RETURN PHYSICIAN APPOINTMENT: 17      ASSESSMENT:  Mr. Vika Martin presents to PT eval w/ report of L shoulder pain. He displayed L shoulder weakness and decreased stability w/ testing. He reports difficulty performing his job demands and heavy house hold chores. He will benefit from continued skilled PT services to improve his deficits listed below and to progress towards his PLOF. PROBLEM LIST (Impacting functional limitations):  1. Decreased Strength  2. Decreased ADL/Functional Activities  3. Increased Pain  4. Decreased Activity Tolerance  5. Edema/Girth  6. Decreased [place] with Home Exercise Program INTERVENTIONS PLANNED:  1. Cold  2. Electrical Stimulation  3. Heat  4. Home Exercise Program (HEP)  5. Manual Therapy  6. Neuromuscular Re-education/Strengthening  7. Range of Motion (ROM)  8. Therapeutic Activites  9. Therapeutic Exercise/Strengthening   TREATMENT PLAN:  Effective Dates: 17 TO 18. Frequency/Duration: 2 times a week for 4 weeks  GOALS: (Goals have been discussed and agreed upon with patient. Discharge Goals: Time Frame: 4 weeks  1. Pt will be independent w/ HEP in order to improve outcomes and decrease pain levels. 2. Pt will havwe QUICKDASH score of 19 or less in order to display decreased pain and decreased functional impairments.    3. Pt will complete 10 continuous minutes on the UBE level 2 in order to display improved strength and endurance for return to work. 4. Pt will report L shoulder pain of 1/10 while abduction his L shoulder in order to return to work duties and household chores. Rehabilitation Potential For Stated Goals: Good  Regarding Juanis Walls therapy, I certify that the treatment plan above will be carried out by a therapist or under their direction. The information in this section was collected on 8/31/17 (except where otherwise noted). HISTORY:   History of Present Injury/Illness (Reason for Referral):  Pt reports he began to have L shoulder pain at work in the Spring. He was diagnosed w/ L shoulder bursitis by his MD. He had a cortisone shot on 8/28/17. He is on a 10# work restriction per his MD.   Past Medical History/Comorbidities:   Mr. Abdoul Samano  has a past medical history of Anxiety; Hypertriglyceridemia; Low HDL (under 40); Palpitations; and Pituitary tumor. Mr. Abdoul Samano  has no past surgical history on file. Social History/Living Environment:     Lives w/ spouse in private home w/ stairs. He has a tub/shower combo. Prior Level of Function/Work/Activity:  Works at [employer] where he has to be able to lift 25# repeatedly to perform his job duties. 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 He walks 3x a week for 30 minutes. Dominant Side:         RIGHT    Current Medications:       Current Outpatient Prescriptions:     levothyroxine (SYNTHROID) 88 mcg tablet, Take 88 mcg by mouth., Disp: , Rfl:     CABERGOLINE (DOSTINEX PO), Take 0.5 mg by mouth., Disp: , Rfl:    Date Last Reviewed:  9/26/2017    EXAMINATION:   Observation/Orthostatic Postural Assessment:           Forward head, rounded shoulders, kyphotic posture  Palpation:          Tender to L biceps and deltoid   Special Tests:          Tempie Salon: (+)        Painful Arc: (+)        Sulcus sign (+)        Infraspinatus test: painful        Lift off: painful         Hands behind head and behind back - equal on both sides  Edema/Girth:  Mild swelling noted on L shoulder   Sensation:         WFL on B UEs w/ light touch    Joint/Muscle ROM Strength    Shoulder flexion WFL L 4-/5, R 5/5    Shoulder extension WFL L 5/5, R 5/5    Shoulder IR WFL L 4-/5, R 5/5    Shoulder ER WFL L 4-/5, R 5/5    Elbow flexion [facility] WFL    Elbow extension [facility]         Body Structures Involved:  1. Joints  2. Muscles Body Functions Affected:  1. Sensory/Pain  2. Neuromusculoskeletal  3. Movement Related Activities and Participation Affected:  1. General Tasks and Demands  2. Mobility  3. Self Care  4. Domestic Life  5. Interpersonal Interactions and Relationships  6. Community, Social and Civic Life   CLINICAL PRESENTATION:   CLINICAL DECISION MAKING:   Outcome Measure: Tool Used: Disabilities of the Arm, Shoulder and Hand (DASH) Questionnaire - Quick Version  Score:  Initial: 20/55  Most Recent: X/55 (Date: -- )   Interpretation of Score: The DASH is designed to measure the activities of daily living in person's with upper extremity dysfunction or pain. Each section is scored on a 1-5 scale, 5 representing the greatest disability. The scores of each section are added together for a total score of 55. Score 11 12-19 20-28 29-37 38-45 46-54 55   Modifier CH CI CJ CK CL CM CN     ? Carrying, Moving, and Handling Objects:     - CURRENT STATUS: CJ - 20%-39% impaired, limited or restricted    - GOAL STATUS: CI - 1%-19% impaired, limited or restricted    - D/C STATUS:  ---------------To be determined---------------        · Medical Necessity: Patient is expected to demonstrate progress in strength, range of motion and balance to increase independence with functional tasks. Reason for Services/Other Comments:  · Patient continues to demonstrate capacity to improve strength, motion, pain levels which will increase independence.             TREATMENT:   (In addition to Assessment/Re-Assessment sessions the following treatments were rendered)  Pre-treatment Symptoms/Complaints: He notes he is definitely getting better. He reports he was able to change a light fixture w/o increasing his pain levels. Pain: Initial:     1/10 Post Session:  0.5/10     MANUAL THERAPY: (8 minutes): Soft tissue mobilization was utilized and necessary because of the patient's painful spasm. Long axis glides, posterior glides, STM to L deltoid and L biceps  MODALITIES: (10 minutes):      vasopneumatic compression with Game Ready moderate compression and temp 36 degrees to left shoulder for edema reduction. THERAPEUTIC EXERCISE: (35 minutes):  Exercises per grid below to improve mobility, strength, balance and coordination. Required minimal verbal and manual cues to promote proper body alignment, promote proper body posture and promote proper body mechanics. Progressed resistance, range, repetitions and complexity of movement as indicated.    Date:  8/31/17 Date:  9/5/17 Date:  9/7/17 Date:  9/12/17 Date:  9/14/17 Date:  9/19/17 Date:  9/21/17 Date:  9/26/17   Activity/Exercise Parameters Parameters Parameters Parameters Parameters Parameters Parameters Parameters   HEP  Issued - shoulder ER, IR, flexion, and scap retraction          Shoulder flexion 10x RTB          Shoulder IR 10x RTB 3x10, GTB 3x10, GTB 3x10, GTB 3x10, GTB  20x, sidelying, 2#    Shoulder ER 10x RTB 3x10, GTB 3x10, GTB 3x10, GTB 3x10, GTB  20x, sidelying, 2#    Scapular retraction 10x          Wall clock   10x, YTB        UBE  1.6 5/5 1.5 5/5 2.0 5/5 2.0 5/5 2.0 5/5 5/5 2.0 5/5 2.0   Quadruped balance           Body blade   10x into flexion        Pulleys  20x abd and flex 30x abd 30x into abd        Rows  2x10, stopped due to pain, GTB 3x10, prone, 2# 3x10, prone 2# 3x10, prone 2# 3x10, prone 2# 3x10, prone, 2#    Is  YTB, 15x 3x10, prone, 2# 3x10, prone, 2# 3x10, prone 2# 3x10, prone 2# 3x10, prone, 2#    scap retraction     3x10, prone 2# 3x10, prone 2#     Cable cross diagonals     7#  4 x 10 7#, 4x10     Wall push ups     X 10 3x10     Ball on wall  flx/abd     4 x 10 4x10     Shoulder ROM cirlces       20x 15x    Wand flexion, extension, ER       30x w/ 2# bar 20x w/ bar 2# bar   Scaption       20x, 2#    Sleeper stretch       L 3x30 sec L 10x10 sec   Cross body stretch       L 3x30 sec L 10x10 sec   Thoracic extension       10x in chair 10x in chair   Pec stretch       1 min supine on foam 1 min supine on foam        Treatment/Session Assessment:    · Response to Treatment:  Pt continues to progress towards his goals. Potential DC next visit. · Compliance with Program/Exercises: Will assess as treatment progresses. · Recommendations/Intent for next treatment session: \"Next visit will focus on advancements to more challenging activities\".  Soft tissue to L biceps and deltoid tender areas  Variance from POC: none  PT Patient Time In/Time Out  Time In: 7212  Time Out: [address], PT

## 2017-09-28 ENCOUNTER — HOSPITAL ENCOUNTER (OUTPATIENT)
Dept: PHYSICAL THERAPY | Age: 47
Discharge: HOME | End: 2017-09-28
Payer: COMMERCIAL

## 2017-09-28 PROCEDURE — 97016 VASOPNEUMATIC DEVICE THERAPY: CPT

## 2017-09-28 PROCEDURE — 97140 MANUAL THERAPY 1/> REGIONS: CPT

## 2017-09-28 PROCEDURE — 97110 THERAPEUTIC EXERCISES: CPT

## 2017-09-28 NOTE — PROGRESS NOTES
Kilo Negron  : 1970 [facility]  [address], [address], [place]  [phone]   [phone]         OUTPATIENT PHYSICAL THERAPY:Daily Note and Discharge 2017    ICD-10: Treatment Diagnosis: Pain in left shoulder (M25.512); Bursitis of left shoulder (M75.52)  Precautions/Allergies: Other plant, animal, environmental  Fall Risk Score: 1 (? 5 = High Risk)  MD Orders: PT eval and tx - shoulder program MEDICAL/REFERRING DIAGNOSIS:  Pain in left shoulder   DATE OF ONSET: Spring of 2017  REFERRING PHYSICIAN: Brigitte Karimi MD  RETURN PHYSICIAN APPOINTMENT: 17      ASSESSMENT:  Mr. Inge Tovar presented to PT mario w/ report of L shoulder pain. He has increased his L shoulder strength and motion, while decreasing his pain. Additionally, he has met all of his PT goals and is independent w/ HEP. DC from outpatient PT at this time. TREATMENT PLAN:  GOALS: (Goals have been discussed and agreed upon with patient. Discharge Goals: Time Frame: 4 weeks  1. Pt will be independent w/ HEP in order to improve outcomes and decrease pain levels. MET  2. Pt will havwe QUICKDASH score of 19 or less in order to display decreased pain and decreased functional impairments. MET  3. Pt will complete 10 continuous minutes on the UBE level 2 in order to display improved strength and endurance for return to work. MET  4. Pt will report L shoulder pain of 1/10 or less while abduction his L shoulder in order to return to work duties and household chores. MET    Rehabilitation Potential For Stated Goals: Good              The information in this section was collected on 17 (except where otherwise noted). HISTORY:   History of Present Injury/Illness (Reason for Referral):  Pt reports he began to have L shoulder pain at work in the Spring. He was diagnosed w/ L shoulder bursitis by his MD. He had a cortisone shot on 17.  He is on a 10# work restriction per his MD.   Past Medical History/Comorbidities:   Mr. Gómez Leary  has a past medical history of Anxiety; Hypertriglyceridemia; Low HDL (under 40); Palpitations; and Pituitary tumor. Mr. Gómez Leary  has no past surgical history on file. Social History/Living Environment:     Lives w/ spouse in private home w/ stairs. He has a tub/shower combo. Prior Level of Function/Work/Activity:  Works at [employer] where he has to be able to lift 25# repeatedly to perform his job duties. 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 He walks 3x a week for 30 minutes. Dominant Side:         RIGHT    Current Medications:       Current Outpatient Prescriptions:     levothyroxine (SYNTHROID) 88 mcg tablet, Take 88 mcg by mouth., Disp: , Rfl:     CABERGOLINE (DOSTINEX PO), Take 0.5 mg by mouth., Disp: , Rfl:    Date Last Reviewed:  9/28/2017    EXAMINATION:   Observation/Orthostatic Postural Assessment: Forward head, rounded shoulders, kyphotic posture  Palpation:          Tender to L biceps and deltoid   Special Tests:          Brittani Fogo: (+)        Painful Arc: (+)        Sulcus sign (+)        Infraspinatus test: painful        Lift off: painful         Hands behind head and behind back - equal on both sides  Edema/Girth:  Mild swelling noted on L shoulder   Sensation:         WFL on B UEs w/ light touch    Joint/Muscle ROM Strength    Shoulder flexion WFL L 5/5, R 5/5    Shoulder extension WFL L 5/5, R 5/5    Shoulder IR WFL L 5/5, R 5/5    Shoulder ER WFL L 5/5, R 5/5    Elbow flexion [facility] WFL    Elbow extension [facility] WFL         Body Structures Involved:  1. Joints  2. Muscles Body Functions Affected:  1. Sensory/Pain  2. Neuromusculoskeletal  3. Movement Related Activities and Participation Affected:  1. General Tasks and Demands  2. Mobility  3. Self Care  4. Domestic Life  5. Interpersonal Interactions and Relationships  6.  Community, Social and Civic Life   CLINICAL PRESENTATION:   CLINICAL DECISION MAKING: Outcome Measure: Tool Used: Disabilities of the Arm, Shoulder and Hand (DASH) Questionnaire - Quick Version  Score:  Initial: 20/55  Most Recent: 14/55 (Date: 9/28/17 )   Interpretation of Score: The DASH is designed to measure the activities of daily living in person's with upper extremity dysfunction or pain. Each section is scored on a 1-5 scale, 5 representing the greatest disability. The scores of each section are added together for a total score of 55. Score 11 12-19 20-28 29-37 38-45 46-54 55   Modifier CH CI CJ CK CL CM CN     ? Carrying, Moving, and Handling Objects:     - CURRENT STATUS: CI - 1%-19% impaired, limited or restricted    - GOAL STATUS: CI - 1%-19% impaired, limited or restricted    - D/C STATUS:  CI - 1%-19% impaired, limited or restricted              TREATMENT:   (In addition to Assessment/Re-Assessment sessions the following treatments were rendered)  Pre-treatment Symptoms/Complaints: He reports he is feeling pretty good. Still a tiny bit of pain w/ shoulder abduction. Pain: Initial:     0.5/10 Post Session:  0.5/10     MANUAL THERAPY: (20 minutes): Soft tissue mobilization was utilized and necessary because of the patient's painful spasm. Long axis glides, posterior glides, STM to L deltoid and L biceps  MODALITIES: (15 minutes):      vasopneumatic compression with Game Ready moderate compression and temp 36 degrees to left shoulder for edema reduction. THERAPEUTIC EXERCISE: (10 minutes):  Exercises per grid below to improve mobility, strength, balance and coordination. Required minimal verbal and manual cues to promote proper body alignment, promote proper body posture and promote proper body mechanics. Progressed resistance, range, repetitions and complexity of movement as indicated.    Date:  8/31/17 Date:  9/5/17 Date:  9/7/17 Date:  9/12/17 Date:  9/14/17 Date:  9/19/17 Date:  9/21/17 Date:  9/26/17 Date:  9/28/17   Activity/Exercise Parameters Parameters Parameters Parameters Parameters Parameters Parameters Parameters    HEP  Issued - shoulder ER, IR, flexion, and scap retraction           Shoulder flexion 10x RTB           Shoulder IR 10x RTB 3x10, GTB 3x10, GTB 3x10, GTB 3x10, GTB  20x, sidelying, 2#     Shoulder ER 10x RTB 3x10, GTB 3x10, GTB 3x10, GTB 3x10, GTB  20x, sidelying, 2#     Scapular retraction 10x           Wall clock   10x, YTB         UBE  1.6 5/5 1.5 5/5 2.0 5/5 2.0 5/5 2.0 5/5 5/5 2.0 5/5 2.0 5/5 2.0   Quadruped balance            Body blade   10x into flexion         Pulleys  20x abd and flex 30x abd 30x into abd         Rows  2x10, stopped due to pain, GTB 3x10, prone, 2# 3x10, prone 2# 3x10, prone 2# 3x10, prone 2# 3x10, prone, 2#     Is  YTB, 15x 3x10, prone, 2# 3x10, prone, 2# 3x10, prone 2# 3x10, prone 2# 3x10, prone, 2#     scap retraction     3x10, prone 2# 3x10, prone 2#      Cable cross diagonals     7#  4 x 10 7#, 4x10      Wall push ups     X 10 3x10      Ball on wall  flx/abd     4 x 10 4x10      Shoulder ROM cirlces       20x 15x     Wand flexion, extension, ER       30x w/ 2# bar 20x w/ bar 2# bar    Scaption       20x, 2#     Sleeper stretch       L 3x30 sec L 10x10 sec    Cross body stretch       L 3x30 sec L 10x10 sec    Thoracic extension       10x in chair 10x in chair    Pec stretch       1 min supine on foam 1 min supine on foam         Treatment/Session Assessment:    · Response to Treatment:  Pt has met all of his PT goals. He is independent w/ his HEP. DC from outpatient PT.   · Compliance with Program/Exercises: Will assess as treatment progresses.     Variance from POC: none  PT Patient Time In/Time Out  Time In: 1430  Time Out: [address], PT

## 2017-10-23 ENCOUNTER — HOSPITAL ENCOUNTER (OUTPATIENT)
Dept: PHYSICAL THERAPY | Age: 47
Discharge: HOME | End: 2017-10-23
Payer: COMMERCIAL

## 2017-10-23 ENCOUNTER — APPOINTMENT (OUTPATIENT)
Dept: PHYSICAL THERAPY | Age: 47
End: 2017-10-23

## 2017-10-23 PROCEDURE — 97016 VASOPNEUMATIC DEVICE THERAPY: CPT

## 2017-10-23 PROCEDURE — 97161 PT EVAL LOW COMPLEX 20 MIN: CPT

## 2017-10-23 NOTE — PROGRESS NOTES
Ambulatory/Rehab Services H2 Model Falls Risk Assessment    Risk Factor Pts. ·   Confusion/Disorientation/Impulsivity  []    4 ·   Symptomatic Depression  []   2 ·   Altered Elimination  []   1 ·   Dizziness/Vertigo  []   1 ·   Gender (Male)  [x]   1 ·   Any administered antiepileptics (anticonvulsants):  []   2 ·   Any administered benzodiazepines:  []   1 ·   Visual Impairment (specify):  []   1 ·   Portable Oxygen Use  []   1 ·   Orthostatic ? BP  []   1 ·   History of Recent Falls (within 3 mos.)  []   5     Ability to Rise from Chair (choose one) Pts. ·   Ability to rise in a single movement  [x]   0 ·   Pushes up, successful in one attempt  []   1 ·   Multiple attempts, but successful  []   3 ·   Unable to rise without assistance  []   4   Total: (5 or greater = High Risk) 1     Falls Prevention Plan:   []                Physical Limitations to Exercise (specify):   []                Mobility Assistance Device (type):   []                Exercise/Equipment Adaptation (specify):    ©2010 [facility] [place] Patent #[id number].  Federal Law prohibits the replication, distribution or use without written permission from [facility] [employer]

## 2017-10-23 NOTE — PROGRESS NOTES
Agueda Moseley  : 1970  Payor: [address] [place] [address] Road / Plan: Valerie Doug / Product Type: Workers Comp /    [address] at [address]  [address], [address] [facility] [address], [address]  [phone]   [phone]         OUTPATIENT PHYSICAL THERAPY:Initial Assessment and Daily Note 10/23/2017    ICD-10: Treatment Diagnosis: Pain in left shoulder (M25.512); Stiffness of left shoulder, not elsewhere classified (M25.612)  Precautions/Allergies: Other plant, animal, environmental   Fall Risk Score: 1 (? 5 = High Risk)  MD Orders: PT eval and tx - shoulder program MEDICAL/REFERRING DIAGNOSIS:  Pain in left shoulder   DATE OF ONSET: Uncertain  REFERRING PHYSICIAN: Smita Sheldon MD  RETURN PHYSICIAN APPOINTMENT: 11/10/17     INITIAL ASSESSMENT:  Mr. Robby Hoffman presents to PT eval w/ c/o L shoulder pain for some time. He completed PT back in September, but his L shoulder has still been bothering him w/ daily activities and work duties. He displays minimal decreased motion and strength in his L shoulder. He will benefit from continued skilled PT services to improve his deficits listed below and to progress towards his PLOF. PROBLEM LIST (Impacting functional limitations):  1. Decreased Strength  2. Decreased ADL/Functional Activities  3. Increased Pain  4. Decreased Activity Tolerance  5. Decreased Flexibility/Joint Mobility  6. Edema/Girth  7. Decreased [place] with Home Exercise Program INTERVENTIONS PLANNED:  1. Cold  2. Electrical Stimulation  3. Heat  4. Home Exercise Program (HEP)  5. Manual Therapy  6. Range of Motion (ROM)  7. Therapeutic Activites  8. Therapeutic Exercise/Strengthening   TREATMENT PLAN:  Effective Dates: 10/23/17 TO 12/15/17. Frequency/Duration: 2 times a week for 3 weeks  GOALS: (Goals have been discussed and agreed upon with patient.)  Discharge Goals: Time Frame: 3 weeks  Pt will be independent w/ HEP in order to improve outcomes and decrease pain levels.    Pt will have QUICKDASH score of 16 or less in order to display decreased pain and decreased functional impairments. Pt will have WFL L shoulder ROM w/ pain 1/10 or less in order to improve functional mobility. Pt will report 1/10 pain w/ work duties in order to return to his job. Rehabilitation Potential For Stated Goals: Good  Regarding Kiki Neves therapy, I certify that the treatment plan above will be carried out by a therapist or under their direction. Thank you for this referral,  Ivette Aguila PT     Referring Physician Signature: Ashutosh Oleary MD              Date                    The information in this section was collected on 10/23/17 (except where otherwise noted). HISTORY:   History of Present Injury/Illness (Reason for Referral):  Pt reports he starts back at work today. He notes he has still been doing his exercises at home and was able to change out a light fixture. He notes he is still having issues reaching behind his back and performing work duties. He completed PT and was doing well, but not quite 100% so his MD sent a referral for 3 more weeks of PT (9/29/17), but he was unable to get into the schedule until today. (10/23/17)  Past Medical History/Comorbidities:   Mr. Dc Keane  has a past medical history of Anxiety; Hypertriglyceridemia; Low HDL (under 40); Palpitations; and Pituitary tumor. Mr. Dc Keane  has no past surgical history on file. Social History/Living Environment:    Lives w/ spouse in split level home w/ tub/shower combo and walk in shower. 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    Dominant Side:         RIGHT    Current Medications:       Current Outpatient Prescriptions:     levothyroxine (SYNTHROID) 88 mcg tablet, Take 88 mcg by mouth., Disp: , Rfl:     CABERGOLINE (DOSTINEX PO), Take 0.5 mg by mouth., Disp: , Rfl:    Meloxicam 15 mg, 1x/day   Date Last Reviewed: 10/23/2017    Number of Personal Factors/Comorbidities that affect the Plan of Care: 1-2: MODERATE COMPLEXITY   EXAMINATION:   Observation/Orthostatic Postural Assessment: Forward head and rounded shoulders  Palpation:          Tender on L deltoid and L biceps  Sensation:         Intact w/ light touch to B UEs    Joint/Muscle ROM Strength   Shoulder flexion 180* L and R L and R 5/5   Shoulder extension WFL L and R 5/5   Shoulder abduction 180* L and R L 4/5, R 5/5 (pain on L)   Elbow flexion WFL L and R 5/5   Elbow extension WFL L and R 5/5   Shoulder IR 90*L, 84* R L and R 5/5 (pain on L)   Shoulder ER 65*L, 84* R L and R 5/5        Body Structures Involved:  1. Bones  2. Joints  3. Muscles  4. Ligaments Body Functions Affected:  1. Sensory/Pain  2. Neuromusculoskeletal  3. Movement Related Activities and Participation Affected:  1. General Tasks and Demands  2. Self Care  3. Domestic Life  4. Interpersonal Interactions and Relationships  5. Community, Social and [place]   Number of elements (examined above) that affect the Plan of Care: 3: MODERATE COMPLEXITY   CLINICAL PRESENTATION:   Presentation: Stable and uncomplicated: LOW COMPLEXITY   CLINICAL DECISION MAKING:   Outcome Measure: Tool Used: Disabilities of the Arm, Shoulder and Hand (DASH) Questionnaire - Quick Version  Score:  Initial: 18/55  Most Recent: X/55 (Date: -- )   Interpretation of Score: The DASH is designed to measure the activities of daily living in person's with upper extremity dysfunction or pain. Each section is scored on a 1-5 scale, 5 representing the greatest disability. The scores of each section are added together for a total score of 55. Score 11 12-19 20-28 29-37 38-45 46-54 55   Modifier CH CI CJ CK CL CM CN     ?  Carrying, Moving, and Handling Objects:     - CURRENT STATUS: CI - 1%-19% impaired, limited or restricted    - GOAL STATUS: CI - 1%-19% impaired, limited or restricted    - D/C STATUS:  ---------------To be determined---------------        Medical Necessity:   · Patient is expected to demonstrate progress in strength, range of motion and coordination to increase independence with functional tasks. Reason for Services/Other Comments:  · Patient continues to demonstrate capacity to improve strength, ROM, coordination which will increase independence. Use of outcome tool(s) and clinical judgement create a POC that gives a: Clear prediction of patient's progress: LOW COMPLEXITY            TREATMENT:   (In addition to Assessment/Re-Assessment sessions the following treatments were rendered)  Pre-treatment Symptoms/Complaints:  See above. Pain: Initial:   1/10 Post Session:  0/10     MANUAL THERAPY: (5 minutes): Joint mobilization and Soft tissue mobilization was utilized and necessary because of the patient's restricted joint motion and restricted motion of soft tissue. MODALITIES: (15 minutes):      *  Cold Pack Therapy in order to reduce inflammation and edema. Re-assessment was performed today (see above assessment section). Separate time was dedicated today for this re-assessment. Treatment/Session Assessment:    · Response to Treatment:  Pt reported relief w/ long axis to L shoulder and w/ ice. He was tight w/ soft tissue work to L deltoid and biceps. · Compliance with Program/Exercises: Will assess as treatment progresses. · Recommendations/Intent for next treatment session: \"Next visit will focus on advancements to more challenging activities\".     [employer] Portal    Variance from POC: none PT Patient Time In/Time Out  Time In: 0835  Time Out: 1401 Franky Bray, PT

## 2017-10-26 ENCOUNTER — HOSPITAL ENCOUNTER (OUTPATIENT)
Dept: PHYSICAL THERAPY | Age: 47
Discharge: HOME | End: 2017-10-26
Payer: COMMERCIAL

## 2017-10-26 PROCEDURE — 97140 MANUAL THERAPY 1/> REGIONS: CPT

## 2017-10-26 PROCEDURE — 97016 VASOPNEUMATIC DEVICE THERAPY: CPT

## 2017-10-26 PROCEDURE — 97110 THERAPEUTIC EXERCISES: CPT

## 2017-10-26 NOTE — PROGRESS NOTES
Soumya Troncoso  : 1970  Payor: [address] [place] [address] Road / Plan: [employer] / Product Type: Workers Comp /    [address]  at [facility]  [address], [address], [address]  [phone]   [phone]         OUTPATIENT PHYSICAL THERAPY:Daily Note 10/26/2017    ICD-10: Treatment Diagnosis: Pain in left shoulder (M25.512); Stiffness of left shoulder, not elsewhere classified (M25.612)  Precautions/Allergies: Other plant, animal, environmental   Fall Risk Score: 1 (? 5 = High Risk)  MD Orders: PT eval and tx - shoulder program MEDICAL/REFERRING DIAGNOSIS:  Pain in left shoulder   DATE OF ONSET: Uncertain  REFERRING PHYSICIAN: Steve Trujillo MD  RETURN PHYSICIAN APPOINTMENT: 11/10/17     ASSESSMENT:  Mr. Sandy Richards presents to PT eval w/ c/o L shoulder pain for some time. He completed PT back in September, but his L shoulder has still been bothering him w/ daily activities and work duties. He displays minimal decreased motion and strength in his L shoulder. He will benefit from continued skilled PT services to improve his deficits listed below and to progress towards his PLOF. PROBLEM LIST (Impacting functional limitations):  1. Decreased Strength  2. Decreased ADL/Functional Activities  3. Increased Pain  4. Decreased Activity Tolerance  5. Decreased Flexibility/Joint Mobility  6. Edema/Girth  7. Decreased [place] with Home Exercise Program INTERVENTIONS PLANNED:  1. Cold  2. Electrical Stimulation  3. Heat  4. Home Exercise Program (HEP)  5. Manual Therapy  6. Range of Motion (ROM)  7. Therapeutic Activites  8. Therapeutic Exercise/Strengthening   TREATMENT PLAN:  Effective Dates: 10/23/17 TO 12/15/17. Frequency/Duration: 2 times a week for 3 weeks  GOALS: (Goals have been discussed and agreed upon with patient.)  Discharge Goals: Time Frame: 3 weeks  Pt will be independent w/ HEP in order to improve outcomes and decrease pain levels.    Pt will have QUICKDASH score of 16 or less in order to display decreased pain and decreased functional impairments. Pt will have WFL L shoulder ROM w/ pain 1/10 or less in order to improve functional mobility. Pt will report 1/10 pain w/ work duties in order to return to his job. The information in this section was collected on 10/23/17 (except where otherwise noted). HISTORY:   History of Present Injury/Illness (Reason for Referral):  Pt reports he starts back at work today. He notes he has still been doing his exercises at home and was able to change out a light fixture. He notes he is still having issues reaching behind his back and performing work duties. He completed PT and was doing well, but not quite 100% so his MD sent a referral for 3 more weeks of PT (9/29/17), but he was unable to get into the schedule until today. (10/23/17)  Past Medical History/Comorbidities:   Mr. Roxie Gorman  has a past medical history of Anxiety; Hypertriglyceridemia; Low HDL (under 40); Palpitations; and Pituitary tumor. Mr. Roxie Gorman  has no past surgical history on file. Social History/Living Environment:    Lives w/ spouse in split level home w/ tub/shower combo and walk in shower. 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 Dominant Side:         RIGHT    Current Medications:       Current Outpatient Prescriptions:     levothyroxine (SYNTHROID) 88 mcg tablet, Take 88 mcg by mouth., Disp: , Rfl:     CABERGOLINE (DOSTINEX PO), Take 0.5 mg by mouth., Disp: , Rfl:    Meloxicam 15 mg, 1x/day   Date Last Reviewed:  10/26/2017    EXAMINATION:   Observation/Orthostatic Postural Assessment:           Forward head and rounded shoulders  Palpation:          Tender on L deltoid and L biceps  Sensation:         Intact w/ light touch to B UEs    Joint/Muscle ROM Strength   Shoulder flexion 180* L and R L and R 5/5   Shoulder extension WFL L and R 5/5   Shoulder abduction 180* L and R L 4/5, R 5/5 (pain on L)   Elbow flexion WFL L and R 5/5   Elbow extension WFL L and R 5/5   Shoulder IR 90*L, 84* R L and R 5/5 (pain on L)   Shoulder ER 65*L, 84* R L and R 5/5        Body Structures Involved:  1. Bones  2. Joints  3. Muscles  4. Ligaments Body Functions Affected:  1. Sensory/Pain  2. Neuromusculoskeletal  3. Movement Related Activities and Participation Affected:  1. General Tasks and Demands  2. Self Care  3. Domestic Life  4. Interpersonal Interactions and Relationships  5. Community, Social and Civic Life   CLINICAL PRESENTATION:   CLINICAL DECISION MAKING:   Outcome Measure: Tool Used: Disabilities of the Arm, Shoulder and Hand (DASH) Questionnaire - Quick Version  Score:  Initial: 18/55  Most Recent: X/55 (Date: -- )   Interpretation of Score: The DASH is designed to measure the activities of daily living in person's with upper extremity dysfunction or pain. Each section is scored on a 1-5 scale, 5 representing the greatest disability. The scores of each section are added together for a total score of 55. Score 11 12-19 20-28 29-37 38-45 46-54 55   Modifier CH CI CJ CK CL CM CN     ? Carrying, Moving, and Handling Objects:     - CURRENT STATUS: CI - 1%-19% impaired, limited or restricted    - GOAL STATUS: CI - 1%-19% impaired, limited or restricted    - D/C STATUS:  ---------------To be determined---------------        Medical Necessity:   · Patient is expected to demonstrate progress in strength, range of motion and coordination to increase independence with functional tasks. Reason for Services/Other Comments:  · Patient continues to demonstrate capacity to improve strength, ROM, coordination which will increase independence. TREATMENT:   (In addition to Assessment/Re-Assessment sessions the following treatments were rendered)  Pre-treatment Symptoms/Complaints: Pt reports he worked w/o aggravating it, but noted it was a little sore. He notes his soreness went away w/ icing. Pain: Initial:   0.5-1/10 Post Session:  0/10     THERAPEUTIC EXERCISE: (18 minutes):  Exercises per grid below to improve mobility and strength. Required minimal verbal and tactile cues to promote proper body alignment, promote proper body posture and promote proper body mechanics. Progressed resistance, range, repetitions and complexity of movement as indicated. MANUAL THERAPY: (30 minutes): Joint mobilization and Soft tissue mobilization was utilized and necessary because of the patient's restricted joint motion, painful spasm and restricted motion of soft tissue. MODALITIES: (10 minutes):      *  Cold Pack Therapy in order to provide analgesia and reduce inflammation and edema. Long axis to L shoulder, STM to L subscap, teres major, teres minor, and L deltoid   Date:  10/26/17 Date:   Date:     Activity/Exercise Parameters Parameters Parameters   UBE 5/5, level 2     Shoulder ER      Shoulder IR      Shoulder flexion      Shoulder extension      Shoulder abduction      Shoulder rolls      Sleeper stretch 3x30 sec     ER stretch 3x30 sec (stopped before pain)     Pec stretch 1 min foam roll     Thoracic stretch 1 min foam roll           Treatment/Session Assessment:    · Response to Treatment:  Pt reported no change in pain levels following manual and ice. He displayed pain w/ abduction and ER this session. · Compliance with Program/Exercises: Will assess as treatment progresses. · Recommendations/Intent for next treatment session: \"Next visit will focus on advancements to more challenging activities\".     [facility] Portal    Variance from POC: none PT Patient Time In/Time Out  Time In: 1300  Time Out: AMITA [address] 16, PT

## 2017-11-09 ENCOUNTER — HOSPITAL ENCOUNTER (OUTPATIENT)
Dept: PHYSICAL THERAPY | Age: 47
Discharge: HOME | End: 2017-11-09
Payer: COMMERCIAL

## 2017-11-09 PROCEDURE — 97140 MANUAL THERAPY 1/> REGIONS: CPT

## 2017-11-09 PROCEDURE — 97016 VASOPNEUMATIC DEVICE THERAPY: CPT

## 2017-11-09 PROCEDURE — 97110 THERAPEUTIC EXERCISES: CPT

## 2017-11-09 NOTE — PROGRESS NOTES
Kuldip Cadena  : 1970  Payor: [address] [place] [address] Road / Plan: [employer] / Product Type: Workers Comp /    [address]  at [facility]  [address], [address], [address]  [phone]   [phone]         OUTPATIENT PHYSICAL THERAPY:Daily Note 2017    ICD-10: Treatment Diagnosis: Pain in left shoulder (M25.512); Stiffness of left shoulder, not elsewhere classified (M25.612)  Precautions/Allergies: Other plant, animal, environmental   Fall Risk Score: 1 (? 5 = High Risk)  MD Orders: PT eval and tx - shoulder program MEDICAL/REFERRING DIAGNOSIS:  Pain in left shoulder   DATE OF ONSET: Uncertain  REFERRING PHYSICIAN: nÁgela Lawrence MD  RETURN PHYSICIAN APPOINTMENT: 11/10/17     ASSESSMENT:  Mr. Kae Wharton presents to PT eval w/ c/o L shoulder pain for some time. He completed PT back in September, but his L shoulder has still been bothering him w/ daily activities and work duties. He displays minimal decreased motion and strength in his L shoulder. He will benefit from continued skilled PT services to improve his deficits listed below and to progress towards his PLOF. PROBLEM LIST (Impacting functional limitations):  1. Decreased Strength  2. Decreased ADL/Functional Activities  3. Increased Pain  4. Decreased Activity Tolerance  5. Decreased Flexibility/Joint Mobility  6. Edema/Girth  7. Decreased [place] with Home Exercise Program INTERVENTIONS PLANNED:  1. Cold  2. Electrical Stimulation  3. Heat  4. Home Exercise Program (HEP)  5. Manual Therapy  6. Range of Motion (ROM)  7. Therapeutic Activites  8. Therapeutic Exercise/Strengthening   TREATMENT PLAN:  Effective Dates: 10/23/17 TO 12/15/17. Frequency/Duration: 2 times a week for 3 weeks  GOALS: (Goals have been discussed and agreed upon with patient.)  Discharge Goals: Time Frame: 3 weeks  Pt will be independent w/ HEP in order to improve outcomes and decrease pain levels.    Pt will have QUICKDASH score of 16 or less in order to display decreased pain and decreased functional impairments. Pt will have WFL L shoulder ROM w/ pain 1/10 or less in order to improve functional mobility. Pt will report 1/10 pain w/ work duties in order to return to his job. The information in this section was collected on 10/23/17 (except where otherwise noted). HISTORY:   History of Present Injury/Illness (Reason for Referral):  Pt reports he starts back at work today. He notes he has still been doing his exercises at home and was able to change out a light fixture. He notes he is still having issues reaching behind his back and performing work duties. He completed PT and was doing well, but not quite 100% so his MD sent a referral for 3 more weeks of PT (9/29/17), but he was unable to get into the schedule until today. (10/23/17)  Past Medical History/Comorbidities:   Mr. Jann Navarro  has a past medical history of Anxiety; Hypertriglyceridemia; Low HDL (under 40); Palpitations; and Pituitary tumor. Mr. Jann Navarro  has no past surgical history on file. Social History/Living Environment:    Lives w/ spouse in split level home w/ tub/shower combo and walk in shower. 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 Dominant Side:         RIGHT    Current Medications:       Current Outpatient Prescriptions:     levothyroxine (SYNTHROID) 88 mcg tablet, Take 88 mcg by mouth., Disp: , Rfl:     CABERGOLINE (DOSTINEX PO), Take 0.5 mg by mouth., Disp: , Rfl:    Meloxicam 15 mg, 1x/day   Date Last Reviewed:  11/9/2017    EXAMINATION:   Observation/Orthostatic Postural Assessment:           Forward head and rounded shoulders  Palpation:          Tender on L deltoid and L biceps  Sensation:         Intact w/ light touch to B UEs    Joint/Muscle ROM Strength   Shoulder flexion 180* L and R L and R 5/5   Shoulder extension WFL L and R 5/5   Shoulder abduction 180* L and R L 4/5, R 5/5 (pain on L)   Elbow flexion WFL L and R 5/5   Elbow extension WFL L and R 5/5   Shoulder IR 90*L, 84* R L and R 5/5 (pain on L)   Shoulder ER 65*L, 84* R L and R 5/5        Body Structures Involved:  1. Bones  2. Joints  3. Muscles  4. Ligaments Body Functions Affected:  1. Sensory/Pain  2. Neuromusculoskeletal  3. Movement Related Activities and Participation Affected:  1. General Tasks and Demands  2. Self Care  3. Domestic Life  4. Interpersonal Interactions and Relationships  5. Community, Social and Civic Life   CLINICAL PRESENTATION:   CLINICAL DECISION MAKING:   Outcome Measure: Tool Used: Disabilities of the Arm, Shoulder and Hand (DASH) Questionnaire - Quick Version  Score:  Initial: 18/55  Most Recent: X/55 (Date: -- )   Interpretation of Score: The DASH is designed to measure the activities of daily living in person's with upper extremity dysfunction or pain. Each section is scored on a 1-5 scale, 5 representing the greatest disability. The scores of each section are added together for a total score of 55. Score 11 12-19 20-28 29-37 38-45 46-54 55   Modifier CH CI CJ CK CL CM CN     ? Carrying, Moving, and Handling Objects:     - CURRENT STATUS: CI - 1%-19% impaired, limited or restricted    - GOAL STATUS: CI - 1%-19% impaired, limited or restricted    - D/C STATUS:  ---------------To be determined---------------        Medical Necessity:   · Patient is expected to demonstrate progress in strength, range of motion and coordination to increase independence with functional tasks. Reason for Services/Other Comments:  · Patient continues to demonstrate capacity to improve strength, ROM, coordination which will increase independence. TREATMENT:   (In addition to Assessment/Re-Assessment sessions the following treatments were rendered)  Pre-treatment Symptoms/Complaints: Pt reports he worked w/o aggravating it.  He notes he is sore/tired after work, but no pain. Pain: Initial:   1/10 Post Session:  0.5/10     THERAPEUTIC EXERCISE: (13 minutes):  Exercises per grid below to improve mobility and strength. Required minimal verbal and tactile cues to promote proper body alignment, promote proper body posture and promote proper body mechanics. Progressed resistance, range, repetitions and complexity of movement as indicated. MANUAL THERAPY: (17 minutes): Joint mobilization and Soft tissue mobilization was utilized and necessary because of the patient's restricted joint motion, painful spasm and restricted motion of soft tissue. MODALITIES: (15 minutes):      *  Cold Pack Therapy in order to provide analgesia, relieve muscle spasm and reduce inflammation and edema. STM to L deltoid, grade 5 manip to thoracic ~T5  High compression on [place]   Date:  10/26/17 Date:  11/1 Date:  11/9/17   Activity/Exercise Parameters Parameters Parameters   UBE 5/5, level 2 5/5 level 3 5/5 level 3   Shoulder ER      Shoulder IR      Shoulder flexion      Shoulder extension      Shoulder abduction      Shoulder rolls      Sleeper stretch 3x30 sec     ER stretch 3x30 sec (stopped before pain)     Pec stretch 1 min foam roll 1 min foam roll 1 min foam roll   Thoracic stretch 1 min foam roll 1 min foam roll 1 min foam roll   Rows  3#, 3x10 prone 30x         Treatment/Session Assessment:    · Response to Treatment:  Pt's ER was less painful after soft tissue work. He continues to report minimal pain levels in his L shoulder w/ only specific movements. · Compliance with Program/Exercises: Will assess as treatment progresses. · Recommendations/Intent for next treatment session: \"Next visit will focus on advancements to more challenging activities\".     [employer] Portal    Variance from POC: none PT Patient Time In/Time Out  Time In: 1300  Time Out: 1350    Dheeraj Hoyos PT

## 2017-11-15 ENCOUNTER — HOSPITAL ENCOUNTER (OUTPATIENT)
Dept: PHYSICAL THERAPY | Age: 47
Discharge: HOME | End: 2017-11-15
Payer: COMMERCIAL

## 2017-11-15 PROCEDURE — 97016 VASOPNEUMATIC DEVICE THERAPY: CPT

## 2017-11-15 PROCEDURE — 97110 THERAPEUTIC EXERCISES: CPT

## 2017-11-15 NOTE — PROGRESS NOTES
Orrie Ly  : 1970  Payor: [address] [place] [address] Road / Plan: Demi Person / Product Type: Workers Comp /    [address]  at [address]  [facility] [address], [address]  [phone]   [phone]         OUTPATIENT PHYSICAL THERAPY:Daily Note 11/15/2017    ICD-10: Treatment Diagnosis: Pain in left shoulder (M25.512); Stiffness of left shoulder, not elsewhere classified (M25.612)  Precautions/Allergies: Other plant, animal, environmental   Fall Risk Score: 1 (? 5 = High Risk)  MD Orders: PT eval and tx - shoulder program MEDICAL/REFERRING DIAGNOSIS:  Pain in left shoulder   DATE OF ONSET: Uncertain  REFERRING PHYSICIAN: Garrick Baca MD  RETURN PHYSICIAN APPOINTMENT: 11/10/17     ASSESSMENT:  Mr. Leonard Leryo presents to PT eval w/ c/o L shoulder pain for some time. He completed PT back in September, but his L shoulder has still been bothering him w/ daily activities and work duties. He displays minimal decreased motion and strength in his L shoulder. He will benefit from continued skilled PT services to improve his deficits listed below and to progress towards his PLOF. PROBLEM LIST (Impacting functional limitations):  1. Decreased Strength  2. Decreased ADL/Functional Activities  3. Increased Pain  4. Decreased Activity Tolerance  5. Decreased Flexibility/Joint Mobility  6. Edema/Girth  7. Decreased [place] with Home Exercise Program INTERVENTIONS PLANNED:  1. Cold  2. Electrical Stimulation  3. Heat  4. Home Exercise Program (HEP)  5. Manual Therapy  6. Range of Motion (ROM)  7. Therapeutic Activites  8. Therapeutic Exercise/Strengthening   TREATMENT PLAN:  Effective Dates: 10/23/17 TO 12/15/17. Frequency/Duration: 2 times a week for 3 weeks  GOALS: (Goals have been discussed and agreed upon with patient.)  Discharge Goals: Time Frame: 3 weeks  Pt will be independent w/ HEP in order to improve outcomes and decrease pain levels.    Pt will have QUICKDASH score of 16 or less in order to display decreased pain and decreased functional impairments. Pt will have WFL L shoulder ROM w/ pain 1/10 or less in order to improve functional mobility. Pt will report 1/10 pain w/ work duties in order to return to his job. The information in this section was collected on 10/23/17 (except where otherwise noted). HISTORY:   History of Present Injury/Illness (Reason for Referral):  Pt reports he starts back at work today. He notes he has still been doing his exercises at home and was able to change out a light fixture. He notes he is still having issues reaching behind his back and performing work duties. He completed PT and was doing well, but not quite 100% so his MD sent a referral for 3 more weeks of PT (9/29/17), but he was unable to get into the schedule until today. (10/23/17)  Past Medical History/Comorbidities:   Mr. Roxie Gorman  has a past medical history of Anxiety; Hypertriglyceridemia; Low HDL (under 40); Palpitations; and Pituitary tumor. Mr. Roxie Gorman  has no past surgical history on file. Social History/Living Environment:    Lives w/ spouse in split level home w/ tub/shower combo and walk in shower. 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 Dominant Side:         RIGHT    Current Medications:       Current Outpatient Prescriptions:     levothyroxine (SYNTHROID) 88 mcg tablet, Take 88 mcg by mouth., Disp: , Rfl:     CABERGOLINE (DOSTINEX PO), Take 0.5 mg by mouth., Disp: , Rfl:    Meloxicam 15 mg, 1x/day   Date Last Reviewed:  11/15/2017    EXAMINATION:   Observation/Orthostatic Postural Assessment:           Forward head and rounded shoulders  Palpation:          Tender on L deltoid and L biceps  Sensation:         Intact w/ light touch to B UEs    Joint/Muscle ROM Strength   Shoulder flexion 180* L and R L and R 5/5   Shoulder extension WFL L and R 5/5   Shoulder abduction 180* L and R L 4/5, R 5/5 (pain on L)   Elbow flexion WFL L and R 5/5   Elbow extension WFL L and R 5/5   Shoulder IR 90*L, 84* R L and R 5/5 (pain on L)   Shoulder ER 65*L, 84* R L and R 5/5        Body Structures Involved:  1. Bones  2. Joints  3. Muscles  4. Ligaments Body Functions Affected:  1. Sensory/Pain  2. Neuromusculoskeletal  3. Movement Related Activities and Participation Affected:  1. General Tasks and Demands  2. Self Care  3. Domestic Life  4. Interpersonal Interactions and Relationships  5. Community, Social and Civic Life   CLINICAL PRESENTATION:   CLINICAL DECISION MAKING:   Outcome Measure: Tool Used: Disabilities of the Arm, Shoulder and Hand (DASH) Questionnaire - Quick Version  Score:  Initial: 18/55  Most Recent: X/55 (Date: -- )   Interpretation of Score: The DASH is designed to measure the activities of daily living in person's with upper extremity dysfunction or pain. Each section is scored on a 1-5 scale, 5 representing the greatest disability. The scores of each section are added together for a total score of 55. Score 11 12-19 20-28 29-37 38-45 46-54 55   Modifier CH CI CJ CK CL CM CN     ? Carrying, Moving, and Handling Objects:     - CURRENT STATUS: CI - 1%-19% impaired, limited or restricted    - GOAL STATUS: CI - 1%-19% impaired, limited or restricted    - D/C STATUS:  ---------------To be determined---------------        Medical Necessity:   · Patient is expected to demonstrate progress in strength, range of motion and coordination to increase independence with functional tasks. Reason for Services/Other Comments:  · Patient continues to demonstrate capacity to improve strength, ROM, coordination which will increase independence. TREATMENT:   (In addition to Assessment/Re-Assessment sessions the following treatments were rendered)  Pre-treatment Symptoms/Complaints: Pt reports he worked w/o aggravating it.    Pain: Initial:   0.5/10 Post Session:  0.5/10     THERAPEUTIC EXERCISE: (35 minutes):  Exercises per grid below to improve mobility and strength. Required minimal verbal and tactile cues to promote proper body alignment, promote proper body posture and promote proper body mechanics. Progressed resistance, range, repetitions and complexity of movement as indicated. MODALITIES: (10 minutes):      *  Cold Pack Therapy in order to provide analgesia, relieve muscle spasm and reduce inflammation and edema. STM to L deltoid, grade 5 manip to thoracic ~T5  High compression on [place]   Date:  10/26/17 Date:  11/1 Date:  11/9/17   Activity/Exercise Parameters Parameters Parameters   UBE 5/5, level 2 5/5 level 3 5/5 level 3   Shoulder ER   5x10 sec in sit w/ arm on table   Shoulder IR      Shoulder flexion      Shoulder extension   Stretch 10x10 w/ bar in stand   Shoulder abduction   Stretch 10x10 w/ bar in stand    Shoulder rolls   20x   Sleeper stretch 3x30 sec     ER stretch 3x30 sec (stopped before pain)     Pec stretch 1 min foam roll 1 min foam roll 1 min foam roll   Thoracic stretch 1 min foam roll 1 min foam roll 1 min foam roll   Rows  3#, 3x10 prone 2x10 10# cables    Cross body stretch   3x30 sec L UE   Eccentric abduction      Scaption   2x10 4# weights B UEs         Treatment/Session Assessment:    · Response to Treatment:  Pt continues to have 0.5/10 pain levels. He was educated on avoiding abduction to area heal.   · Compliance with Program/Exercises: Will assess as treatment progresses. · Recommendations/Intent for next treatment session: \"Next visit will focus on advancements to more challenging activities\".     [facility] Portal    Variance from POC: none PT Patient Time In/Time Out  Time In: 1330  Time Out: 1415    Julian Mccord PT

## 2017-11-17 ENCOUNTER — HOSPITAL ENCOUNTER (OUTPATIENT)
Dept: PHYSICAL THERAPY | Age: 47
Discharge: HOME | End: 2017-11-17
Payer: COMMERCIAL

## 2017-11-17 PROCEDURE — 97016 VASOPNEUMATIC DEVICE THERAPY: CPT

## 2017-11-17 PROCEDURE — 97140 MANUAL THERAPY 1/> REGIONS: CPT

## 2017-11-17 PROCEDURE — 97110 THERAPEUTIC EXERCISES: CPT

## 2017-11-17 NOTE — THERAPY DISCHARGE
Shy Hand  : 1970  Payor: [address] [place] [address] Road / Plan: [place] [place] / Product Type: Workers Comp /    [address]  at [address]  [address], [address], [address]  [phone]   [phone]         OUTPATIENT PHYSICAL [address] Note and Discharge 2017    ICD-10: Treatment Diagnosis: Pain in left shoulder (M25.512); Stiffness of left shoulder, not elsewhere classified (M25.612)  Precautions/Allergies: Other plant, animal, environmental   Fall Risk Score: 1 (? 5 = High Risk)  MD Orders: PT eval and tx - shoulder program MEDICAL/REFERRING DIAGNOSIS:  Pain in left shoulder   DATE OF ONSET: Uncertain  REFERRING PHYSICIAN: Emory Love MD  RETURN PHYSICIAN APPOINTMENT: 11/10/17     ASSESSMENT:  Mr. Beronica Cardenas presented to PT eval w/ c/o L shoulder pain for some time. He completed PT back in September, but his L shoulder has still been bothering him w/ daily activities and work duties. His pain levels are relatively unchanged at this time. He is able to work as long as he is cautious with his L UE. At this time, he has met 3/4 PT goals. He has reached a plateau with therapy, and his MD is suggesting a steroid to try to get the last 0.5/10 pain out of his L shoulder. DC from outpatient PT at this time. TREATMENT PLAN:  GOALS: (Goals have been discussed and agreed upon with patient.)  Discharge Goals: Time Frame: 3 weeks  Pt will be independent w/ HEP in order to improve outcomes and decrease pain levels. MET  Pt will have QUICKDASH score of 16 or less in order to display decreased pain and decreased functional impairments. Not met  Pt will have WFL L shoulder ROM w/ pain 1/10 or less in order to improve functional mobility. MET  Pt will report 1/10 pain w/ work duties in order to return to his job. MET              The information in this section was collected on 10/23/17 (except where otherwise noted).   HISTORY:   History of Present Injury/Illness (Reason for Referral):  Pt reports he starts back at work today. He notes he has still been doing his exercises at home and was able to change out a light fixture. He notes he is still having issues reaching behind his back and performing work duties. He completed PT and was doing well, but not quite 100% so his MD sent a referral for 3 more weeks of PT (9/29/17), but he was unable to get into the schedule until today. (10/23/17)  Past Medical History/Comorbidities:   Mr. Beronica Cardenas  has a past medical history of Anxiety; Hypertriglyceridemia; Low HDL (under 40); Palpitations; and Pituitary tumor. Mr. Beronica Cardenas  has no past surgical history on file. Social History/Living Environment:    Lives w/ spouse in split level home w/ tub/shower combo and walk in shower. 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 Dominant Side:         RIGHT    Current Medications:       Current Outpatient Prescriptions:     levothyroxine (SYNTHROID) 88 mcg tablet, Take 88 mcg by mouth., Disp: , Rfl:     CABERGOLINE (DOSTINEX PO), Take 0.5 mg by mouth., Disp: , Rfl:    Meloxicam 15 mg, 1x/day   Date Last Reviewed:  11/17/2017    EXAMINATION:   Observation/Orthostatic Postural Assessment: Forward head and rounded shoulders  Palpation:          Tender on L deltoid and L biceps  Sensation:         Intact w/ light touch to B UEs    Joint/Muscle ROM Strength   Shoulder flexion 180* L and R L and R 5/5   Shoulder extension WFL L and R 5/5   Shoulder abduction 180* L and R L 4/5, R 5/5 (pain on L)   Elbow flexion WFL L and R 5/5   Elbow extension WFL L and R 5/5   Shoulder IR 90*L, 84* R L and R 5/5 (pain on L)   Shoulder ER 65*L, 84* R L and R 5/5        Body Structures Involved:  1. Bones  2. Joints  3. Muscles  4. Ligaments Body Functions Affected:  1. Sensory/Pain  2. Neuromusculoskeletal  3.  Movement Related Activities and Participation Affected:  1. General Tasks and Demands  2. Self Care  3. Domestic Life  4. Interpersonal Interactions and Relationships  5. Community, Social and Civic Life   CLINICAL PRESENTATION:   CLINICAL DECISION MAKING:   Outcome Measure: Tool Used: Disabilities of the Arm, Shoulder and Hand (DASH) Questionnaire - Quick Version  Score:  Initial: 18/55  Most Recent: 17/55 (Date: 11/17/17 )   Interpretation of Score: The DASH is designed to measure the activities of daily living in person's with upper extremity dysfunction or pain. Each section is scored on a 1-5 scale, 5 representing the greatest disability. The scores of each section are added together for a total score of 55. Score 11 12-19 20-28 29-37 38-45 46-54 55   Modifier CH CI CJ CK CL CM CN     ? Carrying, Moving, and Handling Objects:     - CURRENT STATUS: CI - 1%-19% impaired, limited or restricted    - GOAL STATUS: CI - 1%-19% impaired, limited or restricted    - D/C STATUS:  CI - 1%-19% impaired, limited or restricted              TREATMENT:   (In addition to Assessment/Re-Assessment sessions the following treatments were rendered)  Pre-treatment Symptoms/Complaints: Pt reports he has been working w/o aggravating it, but that he has to be cautious of how he uses his L arm. Pain: Initial:   1/10 Post Session:  0.5/10     THERAPEUTIC EXERCISE: (15 minutes):  Exercises per grid below to improve mobility and strength. Required minimal verbal and tactile cues to promote proper body alignment, promote proper body posture and promote proper body mechanics. Progressed resistance, range, repetitions and complexity of movement as indicated. MANUAL THERAPY: (20 minutes): Joint mobilization and Soft tissue mobilization was utilized and necessary because of the patient's restricted joint motion and painful spasm.    MODALITIES: (10 minutes):      *  Cold Pack Therapy in order to provide analgesia, relieve muscle spasm and reduce inflammation and edema.     STM to L deltoid, L teres minor, and L pec  Long axis to L shoulder   Date:  10/26/17 Date:  11/1 Date:  11/9/17 Date:  11/17/17   Activity/Exercise Parameters Parameters Parameters Parameter   UBE 5/5, level 2 5/5 level 3 5/5 level 3 5/5 level 3   Shoulder ER   5x10 sec in sit w/ arm on table    Shoulder IR       Shoulder flexion       Shoulder extension   Stretch 10x10 w/ bar in stand    Shoulder abduction   Stretch 10x10 w/ bar in stand     Shoulder rolls   20x    Sleeper stretch 3x30 sec   3x30 sec in sidelying    ER stretch 3x30 sec (stopped before pain)   3x30 sec in supine   Pec stretch 1 min foam roll 1 min foam roll 1 min foam roll 3x30 sec   Thoracic stretch 1 min foam roll 1 min foam roll 1 min foam roll    Rows  3#, 3x10 prone 2x10 10# cables     Cross body stretch   3x30 sec L UE    Eccentric abduction       Scaption   2x10 4# weights B UEs          Treatment/Session Assessment:    · Response to Treatment:  Pt continues to have 0.5/10 pain levels. He was educated on avoiding abduction to area heal. DC at this time due to plateau in therapy. · Compliance with Program/Exercises: Will assess as treatment progresses. [employer] Portal  Access Code: [email or web address]   URL: [email or web address]   Date: 11/17/2017   Prepared by: Felicitas Cheatham     Exercises   Standing Shoulder Scaption with Resistance - 10 reps - 3 sets - 0 hold - 1x daily - 3x weekly   Shoulder Extension with Resistance - Palms Forward - 10 reps - 3 sets - 0 hold - 1x daily - 3x weekly   Seated Thoracic Lumbar Extension - 10 reps - 1 sets - 5 hold - 3x daily - 7x weekly   Doorway Pec Stretch at 60 Degrees Abduction - 3 reps - 1 sets - 30 hold - 3x daily - 7x weekly   Standing Shoulder Posterior Capsule Stretch - 3 reps - 3 sets - 30 hold - 3x daily - 7x weekly   Seated Scapular Retraction - 10 reps - 3 sets - 5 hold - 1x daily - 3x weekly   Sleeper Stretch - 3 reps - 3 sets - 30 hold - 3x daily - 7x weekly   Seated Shoulder Inferior [place] - 10 reps - 1 sets - 10 hold - 3x daily - 7x weekly     Variance from POC: none PT Patient Time In/Time Out  Time In: 1258  Time Out: 628 [facility] [address], PT